# Patient Record
Sex: MALE | Race: WHITE | NOT HISPANIC OR LATINO | Employment: FULL TIME | URBAN - METROPOLITAN AREA
[De-identification: names, ages, dates, MRNs, and addresses within clinical notes are randomized per-mention and may not be internally consistent; named-entity substitution may affect disease eponyms.]

---

## 2020-08-05 ENCOUNTER — OFFICE VISIT (OUTPATIENT)
Dept: UROLOGY | Facility: AMBULATORY SURGERY CENTER | Age: 40
End: 2020-08-05
Payer: COMMERCIAL

## 2020-08-05 VITALS
WEIGHT: 143.4 LBS | HEART RATE: 89 BPM | BODY MASS INDEX: 21.24 KG/M2 | SYSTOLIC BLOOD PRESSURE: 110 MMHG | DIASTOLIC BLOOD PRESSURE: 72 MMHG | HEIGHT: 69 IN | TEMPERATURE: 97.3 F

## 2020-08-05 DIAGNOSIS — Z30.2 ENCOUNTER FOR STERILIZATION: Primary | ICD-10-CM

## 2020-08-05 PROCEDURE — 99203 OFFICE O/P NEW LOW 30 MIN: CPT | Performed by: UROLOGY

## 2020-08-05 RX ORDER — ALBUTEROL SULFATE 90 UG/1
2 AEROSOL, METERED RESPIRATORY (INHALATION)
COMMUNITY
Start: 2015-01-02

## 2020-08-05 RX ORDER — LORAZEPAM 2 MG/1
2 TABLET ORAL
Qty: 1 TABLET | Refills: 0 | Status: SHIPPED | OUTPATIENT
Start: 2020-08-05

## 2020-08-05 NOTE — PROGRESS NOTES
8/5/2020    Marleny Low  1980  [id number]        Assessment  Elective sterilization    Plan  We discussed our vasectomy packet in detail  He understands the indications, risks, and benefits of the procedure  He understands that this is generally considered a permanent procedure although there is a method for reversal, it is not guaranteed to work and would not be covered by insurance  He understands that he would not be considered sterile until negative semen analysis is obtained post procedure  He further understands that he must rest, ice, and elevate the scrotum for at least 48 hours to avoid complications such as hematoma  Consent was obtained in the office today  All of his questions were answered  A prescription for Ativan to be taken prior to the visit was sent to his pharmacy  History of Present Illness  Linda Lopez is a 38 y o  male with 4 children, youngest is 6month-old  He and his wife would like to prevent further pregnancies with permanent sterilization  He denies any prior urologic condition or surgery  He works with thermal labels, occasionally lifting items  Review of Systems  Review of Systems   Constitutional: Negative  HENT: Negative  Respiratory: Negative  Cardiovascular: Negative  Gastrointestinal: Negative  Genitourinary:        As per HPI   Musculoskeletal: Negative  Skin: Negative  Neurological: Negative  Hematological: Negative  Past Medical History  History reviewed  No pertinent past medical history  Past Social History  History reviewed  No pertinent surgical history  Past Family History  History reviewed  No pertinent family history      Past Social history  Social History     Socioeconomic History    Marital status: Single     Spouse name: Not on file    Number of children: Not on file    Years of education: Not on file    Highest education level: Not on file   Occupational History    Not on file   Social Needs  Financial resource strain: Not on file    Food insecurity     Worry: Not on file     Inability: Not on file   [email or web address] needs     Medical: Not on file     Non-medical: Not on file   Tobacco Use    Smoking status: Never Smoker    Smokeless tobacco: Never Used   Substance and Sexual Activity    Alcohol use: Yes     Comment: socially    Drug use: Not on file    Sexual activity: Not on file   Lifestyle    Physical activity     Days per week: Not on file     Minutes per session: Not on file    Stress: Not on file   Relationships    Social connections     Talks on phone: Not on file     Gets together: Not on file     Attends [employer] service: Not on file     Active member of club or organization: Not on file     Attends meetings of clubs or organizations: Not on file     Relationship status: Not on file    Intimate partner violence     Fear of current or ex partner: Not on file     Emotionally abused: Not on file     Physically abused: Not on file     Forced sexual activity: Not on file   Other Topics Concern    Not on file   Social History Narrative    Not on file     Social History     Tobacco Use   Smoking Status Never Smoker   Smokeless Tobacco Never Used       Current Medications  Current Outpatient Medications   Medication Sig Dispense Refill    albuterol (ProAir HFA) 90 mcg/act inhaler Inhale 2 puffs       No current facility-administered medications for this visit  Allergies  No Known Allergies    Past Medical History, Social History, Family History, medications and allergies were reviewed  Vitals  Vitals:    08/05/20 1545   BP: 110/72   BP Location: Left arm   Patient Position: Sitting   Cuff Size: Adult   Pulse: 89   Temp: (!) 97 3 °F (36 3 °C)   TempSrc: Temporal   Weight: 65 kg (143 lb 6 4 oz)   Height: 5' 9" (1 753 m)       Physical Exam  Physical Exam   Constitutional: He is oriented to person, place, and time  He appears well-developed     HENT:   Head: Normocephalic and atraumatic  Eyes: Conjunctivae are normal    Cardiovascular: Normal rate  Pulmonary/Chest: Effort normal    Abdominal: Soft  Genitourinary:    Genitourinary Comments: Normal bilateral Vasa and testes  Musculoskeletal: Normal range of motion  Neurological: He is alert and oriented to person, place, and time  Skin: Skin is warm and dry  Vitals reviewed          Results  No results found for: PSA  No results found for: GLUCOSE, CALCIUM, NA, K, CO2, CL, BUN, CREATININE  No results found for: WBC, HGB, HCT, MCV, PLT

## 2020-09-30 ENCOUNTER — TELEPHONE (OUTPATIENT)
Dept: UROLOGY | Facility: AMBULATORY SURGERY CENTER | Age: 40
End: 2020-09-30

## 2020-10-09 ENCOUNTER — NURSE TRIAGE (OUTPATIENT)
Dept: OTHER | Facility: OTHER | Age: 40
End: 2020-10-09

## 2020-10-09 ENCOUNTER — PROCEDURE VISIT (OUTPATIENT)
Dept: UROLOGY | Facility: AMBULATORY SURGERY CENTER | Age: 40
End: 2020-10-09
Payer: COMMERCIAL

## 2020-10-09 VITALS
TEMPERATURE: 98.2 F | WEIGHT: 143 LBS | BODY MASS INDEX: 21.18 KG/M2 | SYSTOLIC BLOOD PRESSURE: 120 MMHG | HEIGHT: 69 IN | HEART RATE: 90 BPM | DIASTOLIC BLOOD PRESSURE: 60 MMHG

## 2020-10-09 DIAGNOSIS — R11.0 NAUSEA: Primary | ICD-10-CM

## 2020-10-09 DIAGNOSIS — Z30.2 ENCOUNTER FOR STERILIZATION: Primary | ICD-10-CM

## 2020-10-09 PROCEDURE — 88302 TISSUE EXAM BY PATHOLOGIST: CPT | Performed by: PATHOLOGY

## 2020-10-09 PROCEDURE — 55250 REMOVAL OF SPERM DUCT(S): CPT | Performed by: UROLOGY

## 2020-10-09 RX ORDER — HYDROCODONE BITARTRATE AND ACETAMINOPHEN 5; 325 MG/1; MG/1
1 TABLET ORAL EVERY 6 HOURS PRN
Qty: 5 TABLET | Refills: 0 | Status: SHIPPED | OUTPATIENT
Start: 2020-10-09

## 2020-10-09 RX ORDER — ONDANSETRON 4 MG/1
4 TABLET, ORALLY DISINTEGRATING ORAL EVERY 6 HOURS PRN
Qty: 3 TABLET | Refills: 0 | Status: SHIPPED | OUTPATIENT
Start: 2020-10-09

## 2020-10-15 ENCOUNTER — TELEPHONE (OUTPATIENT)
Dept: UROLOGY | Facility: MEDICAL CENTER | Age: 40
End: 2020-10-15

## 2020-10-23 ENCOUNTER — OFFICE VISIT (OUTPATIENT)
Dept: UROLOGY | Facility: AMBULATORY SURGERY CENTER | Age: 40
End: 2020-10-23

## 2020-10-23 VITALS
HEART RATE: 70 BPM | HEIGHT: 69 IN | DIASTOLIC BLOOD PRESSURE: 70 MMHG | BODY MASS INDEX: 21.3 KG/M2 | WEIGHT: 143.8 LBS | SYSTOLIC BLOOD PRESSURE: 102 MMHG | TEMPERATURE: 97.1 F

## 2020-10-23 DIAGNOSIS — Z98.52 STATUS POST VASECTOMY: Primary | ICD-10-CM

## 2020-10-23 PROCEDURE — 99024 POSTOP FOLLOW-UP VISIT: CPT | Performed by: NURSE PRACTITIONER

## 2020-11-16 ENCOUNTER — HOSPITAL ENCOUNTER (EMERGENCY)
Facility: HOSPITAL | Age: 40
Discharge: HOME | End: 2020-11-16
Attending: EMERGENCY MEDICINE
Payer: COMMERCIAL

## 2020-11-16 VITALS
SYSTOLIC BLOOD PRESSURE: 159 MMHG | HEIGHT: 69 IN | BODY MASS INDEX: 21.48 KG/M2 | WEIGHT: 145 LBS | RESPIRATION RATE: 20 BRPM | DIASTOLIC BLOOD PRESSURE: 104 MMHG | OXYGEN SATURATION: 97 % | HEART RATE: 91 BPM | TEMPERATURE: 98.4 F

## 2020-11-16 DIAGNOSIS — S00.83XA TRAUMATIC HEMATOMA OF CHEEK, INITIAL ENCOUNTER: Primary | ICD-10-CM

## 2020-11-16 PROCEDURE — 99283 EMERGENCY DEPT VISIT LOW MDM: CPT

## 2020-11-16 PROCEDURE — 99282 EMERGENCY DEPT VISIT SF MDM: CPT | Performed by: EMERGENCY MEDICINE

## 2020-11-16 RX ORDER — ACETAMINOPHEN 325 MG/1
975 TABLET ORAL ONCE
Status: COMPLETED | OUTPATIENT
Start: 2020-11-16 | End: 2020-11-16

## 2020-11-16 RX ORDER — IBUPROFEN 400 MG/1
400 TABLET ORAL ONCE
Status: COMPLETED | OUTPATIENT
Start: 2020-11-16 | End: 2020-11-16

## 2020-11-16 RX ADMIN — ACETAMINOPHEN 975 MG: 325 TABLET, FILM COATED ORAL at 18:21

## 2020-11-16 RX ADMIN — IBUPROFEN 400 MG: 400 TABLET ORAL at 18:21

## 2020-12-07 ENCOUNTER — OFFICE VISIT (OUTPATIENT)
Dept: LAB | Facility: HOSPITAL | Age: 40
End: 2020-12-07
Payer: COMMERCIAL

## 2020-12-07 DIAGNOSIS — Z98.52 STATUS POST VASECTOMY: ICD-10-CM

## 2020-12-07 LAB
DEPRECATED CD4 CELLS/CD8 CELLS BLD: 3 ML
SPERM MOTILE SMN QL MICRO: ABNORMAL

## 2020-12-07 PROCEDURE — 89321 SEMEN ANAL SPERM DETECTION: CPT

## 2020-12-21 ENCOUNTER — TELEPHONE (OUTPATIENT)
Dept: UROLOGY | Facility: CLINIC | Age: 40
End: 2020-12-21

## 2020-12-21 DIAGNOSIS — Z98.52 STATUS POST VASECTOMY: Primary | ICD-10-CM

## 2020-12-23 ENCOUNTER — OFFICE VISIT (OUTPATIENT)
Dept: LAB | Facility: HOSPITAL | Age: 40
End: 2020-12-23
Payer: COMMERCIAL

## 2020-12-23 DIAGNOSIS — Z98.52 STATUS POST VASECTOMY: ICD-10-CM

## 2020-12-23 LAB
DEPRECATED CD4 CELLS/CD8 CELLS BLD: 1 ML
SPERM MOTILE SMN QL MICRO: ABNORMAL

## 2020-12-23 PROCEDURE — 89321 SEMEN ANAL SPERM DETECTION: CPT

## 2021-04-19 ENCOUNTER — IMMUNIZATIONS (OUTPATIENT)
Dept: FAMILY MEDICINE CLINIC | Facility: HOSPITAL | Age: 41
End: 2021-04-19

## 2021-04-19 DIAGNOSIS — Z23 ENCOUNTER FOR IMMUNIZATION: Primary | ICD-10-CM

## 2021-04-19 PROCEDURE — 91300 SARS-COV-2 / COVID-19 MRNA VACCINE (PFIZER-BIONTECH) 30 MCG: CPT

## 2021-04-19 PROCEDURE — 0001A SARS-COV-2 / COVID-19 MRNA VACCINE (PFIZER-BIONTECH) 30 MCG: CPT

## 2021-05-14 ENCOUNTER — IMMUNIZATIONS (OUTPATIENT)
Dept: FAMILY MEDICINE CLINIC | Facility: HOSPITAL | Age: 41
End: 2021-05-14

## 2021-05-14 DIAGNOSIS — Z23 ENCOUNTER FOR IMMUNIZATION: Primary | ICD-10-CM

## 2021-05-14 PROCEDURE — 91300 SARS-COV-2 / COVID-19 MRNA VACCINE (PFIZER-BIONTECH) 30 MCG: CPT

## 2021-05-14 PROCEDURE — 0002A SARS-COV-2 / COVID-19 MRNA VACCINE (PFIZER-BIONTECH) 30 MCG: CPT

## 2023-11-19 ENCOUNTER — HOSPITAL ENCOUNTER (EMERGENCY)
Facility: HOSPITAL | Age: 43
Discharge: HOME | End: 2023-11-19
Attending: EMERGENCY MEDICINE
Payer: COMMERCIAL

## 2023-11-19 ENCOUNTER — APPOINTMENT (EMERGENCY)
Dept: RADIOLOGY | Facility: HOSPITAL | Age: 43
End: 2023-11-19
Payer: COMMERCIAL

## 2023-11-19 VITALS
DIASTOLIC BLOOD PRESSURE: 84 MMHG | BODY MASS INDEX: 21.41 KG/M2 | HEART RATE: 84 BPM | SYSTOLIC BLOOD PRESSURE: 128 MMHG | TEMPERATURE: 97.5 F | RESPIRATION RATE: 16 BRPM | OXYGEN SATURATION: 97 % | WEIGHT: 145 LBS

## 2023-11-19 DIAGNOSIS — K63.89 MASS OF COLON: ICD-10-CM

## 2023-11-19 DIAGNOSIS — R19.7 DIARRHEA: ICD-10-CM

## 2023-11-19 DIAGNOSIS — K62.5 RECTAL BLEEDING: ICD-10-CM

## 2023-11-19 DIAGNOSIS — K52.9 COLITIS: ICD-10-CM

## 2023-11-19 DIAGNOSIS — K57.92 DIVERTICULITIS: Primary | ICD-10-CM

## 2023-11-19 DIAGNOSIS — R10.30 LOWER ABDOMINAL PAIN: ICD-10-CM

## 2023-11-19 LAB
ALBUMIN SERPL BCP-MCNC: 4.4 G/DL (ref 3.5–5)
ALP SERPL-CCNC: 73 U/L (ref 34–104)
ALT SERPL W P-5'-P-CCNC: 18 U/L (ref 7–52)
ANION GAP SERPL CALCULATED.3IONS-SCNC: 11 MMOL/L
AST SERPL W P-5'-P-CCNC: 19 U/L (ref 13–39)
BASOPHILS # BLD AUTO: 0.04 THOUSANDS/ÂΜL (ref 0–0.1)
BASOPHILS NFR BLD AUTO: 0 % (ref 0–1)
BILIRUB SERPL-MCNC: 1.25 MG/DL (ref 0.2–1)
BUN SERPL-MCNC: 16 MG/DL (ref 5–25)
CALCIUM SERPL-MCNC: 9.5 MG/DL (ref 8.4–10.2)
CHLORIDE SERPL-SCNC: 100 MMOL/L (ref 96–108)
CO2 SERPL-SCNC: 25 MMOL/L (ref 21–32)
CREAT SERPL-MCNC: 1.05 MG/DL (ref 0.6–1.3)
EOSINOPHIL # BLD AUTO: 0.05 THOUSAND/ÂΜL (ref 0–0.61)
EOSINOPHIL NFR BLD AUTO: 0 % (ref 0–6)
ERYTHROCYTE [DISTWIDTH] IN BLOOD BY AUTOMATED COUNT: 13.6 % (ref 11.6–15.1)
GFR SERPL CREATININE-BSD FRML MDRD: 85 ML/MIN/1.73SQ M
GLUCOSE SERPL-MCNC: 91 MG/DL (ref 65–140)
HCT VFR BLD AUTO: 44.4 % (ref 36.5–49.3)
HGB BLD-MCNC: 14.9 G/DL (ref 12–17)
IMM GRANULOCYTES # BLD AUTO: 0.03 THOUSAND/UL (ref 0–0.2)
IMM GRANULOCYTES NFR BLD AUTO: 0 % (ref 0–2)
LYMPHOCYTES # BLD AUTO: 2.05 THOUSANDS/ÂΜL (ref 0.6–4.47)
LYMPHOCYTES NFR BLD AUTO: 18 % (ref 14–44)
MCH RBC QN AUTO: 31.5 PG (ref 26.8–34.3)
MCHC RBC AUTO-ENTMCNC: 33.6 G/DL (ref 31.4–37.4)
MCV RBC AUTO: 94 FL (ref 82–98)
MONOCYTES # BLD AUTO: 1.03 THOUSAND/ÂΜL (ref 0.17–1.22)
MONOCYTES NFR BLD AUTO: 9 % (ref 4–12)
NEUTROPHILS # BLD AUTO: 8.51 THOUSANDS/ÂΜL (ref 1.85–7.62)
NEUTS SEG NFR BLD AUTO: 73 % (ref 43–75)
NRBC BLD AUTO-RTO: 0 /100 WBCS
PLATELET # BLD AUTO: 265 THOUSANDS/UL (ref 149–390)
PMV BLD AUTO: 10.2 FL (ref 8.9–12.7)
POTASSIUM SERPL-SCNC: 3.7 MMOL/L (ref 3.5–5.3)
PROT SERPL-MCNC: 7.5 G/DL (ref 6.4–8.4)
RBC # BLD AUTO: 4.73 MILLION/UL (ref 3.88–5.62)
SODIUM SERPL-SCNC: 136 MMOL/L (ref 135–147)
WBC # BLD AUTO: 11.71 THOUSAND/UL (ref 4.31–10.16)

## 2023-11-19 PROCEDURE — 96366 THER/PROPH/DIAG IV INF ADDON: CPT

## 2023-11-19 PROCEDURE — 99284 EMERGENCY DEPT VISIT MOD MDM: CPT

## 2023-11-19 PROCEDURE — G1004 CDSM NDSC: HCPCS

## 2023-11-19 PROCEDURE — 36415 COLL VENOUS BLD VENIPUNCTURE: CPT

## 2023-11-19 PROCEDURE — 80053 COMPREHEN METABOLIC PANEL: CPT

## 2023-11-19 PROCEDURE — 74177 CT ABD & PELVIS W/CONTRAST: CPT

## 2023-11-19 PROCEDURE — 99285 EMERGENCY DEPT VISIT HI MDM: CPT | Performed by: EMERGENCY MEDICINE

## 2023-11-19 PROCEDURE — 96375 TX/PRO/DX INJ NEW DRUG ADDON: CPT

## 2023-11-19 PROCEDURE — 85025 COMPLETE CBC W/AUTO DIFF WBC: CPT

## 2023-11-19 PROCEDURE — 96365 THER/PROPH/DIAG IV INF INIT: CPT

## 2023-11-19 RX ORDER — AMOXICILLIN AND CLAVULANATE POTASSIUM 875; 125 MG/1; MG/1
1 TABLET, FILM COATED ORAL ONCE
Status: COMPLETED | OUTPATIENT
Start: 2023-11-19 | End: 2023-11-19

## 2023-11-19 RX ORDER — KETOROLAC TROMETHAMINE 30 MG/ML
15 INJECTION, SOLUTION INTRAMUSCULAR; INTRAVENOUS ONCE
Status: COMPLETED | OUTPATIENT
Start: 2023-11-19 | End: 2023-11-19

## 2023-11-19 RX ORDER — AMOXICILLIN AND CLAVULANATE POTASSIUM 875; 125 MG/1; MG/1
1 TABLET, FILM COATED ORAL EVERY 12 HOURS
Qty: 14 TABLET | Refills: 0 | Status: SHIPPED | OUTPATIENT
Start: 2023-11-19 | End: 2023-11-27

## 2023-11-19 RX ADMIN — AMOXICILLIN AND CLAVULANATE POTASSIUM 1 TABLET: 875; 125 TABLET, FILM COATED ORAL at 17:55

## 2023-11-19 RX ADMIN — SODIUM CHLORIDE, SODIUM LACTATE, POTASSIUM CHLORIDE, AND CALCIUM CHLORIDE 1000 ML: .6; .31; .03; .02 INJECTION, SOLUTION INTRAVENOUS at 13:35

## 2023-11-19 RX ADMIN — IOHEXOL 100 ML: 350 INJECTION, SOLUTION INTRAVENOUS at 16:06

## 2023-11-19 RX ADMIN — KETOROLAC TROMETHAMINE 15 MG: 30 INJECTION, SOLUTION INTRAMUSCULAR; INTRAVENOUS at 13:36

## 2023-11-19 NOTE — DISCHARGE INSTRUCTIONS
You were seen in the emergency department for diarrhea, blood in your stool, and lower abdominal pain. Your CT scan showed diverticulitis. Your CT scan also showed a mass or polyp in your colon. A referral was placed to gastroenterology for follow-up. A prescription for Augmentin and antibiotic was sent to your pharmacy. Instructions are attached regarding appropriate diet. If you have worsening pain, develop a fever, or unable to eat or drink, or have worsening rectal bleeding please return to the emergency department.

## 2023-11-19 NOTE — ED PROVIDER NOTES
History  Chief Complaint   Patient presents with    Abdominal Pain     Diarrhea since yesterday started with dark red stools last night, c/o back pain, LQ abd pain, chills     41-year-old male with no significant past medical history who presents complaining of diarrhea with bright red blood in his stool and lower abdominal pain that began 2 days ago. Patient states that he was out of town and had a cheeseburger at [facility] prior to his symptoms beginning. The patient additionally states that he has had low-grade fevers with a Tmax of 100.2 °F.  The patient states that yesterday the blood was bright red in color and today it was darker red. The patient denies rectal pain with bowel movements. The patient reports cramping lower abdominal pain that occasionally radiates to his lower back. The patient took Tylenol last night. The patient denies headache, URI symptoms, chest pain, shortness of breath, nausea, vomiting, dysuria, hematuria. Prior to Admission Medications   Prescriptions Last Dose Informant Patient Reported? Taking? HYDROcodone-acetaminophen (NORCO) 5-325 mg per tablet  Self No No   Sig: Take 1 tablet by mouth every 6 (six) hours as needed for pain for up to 5 dosesMax Daily Amount: 4 tablets   Patient not taking: Reported on 10/23/2020   LORazepam (ATIVAN) 2 mg tablet  Self No No   Sig: Take 1 tablet (2 mg total) by mouth 60 minutes pre-procedure   Patient not taking: Reported on 10/23/2020   albuterol (ProAir HFA) 90 mcg/act inhaler  Self Yes No   Sig: Inhale 2 puffs   ondansetron (ZOFRAN-ODT) 4 mg disintegrating tablet  Self No No   Sig: Take 1 tablet (4 mg total) by mouth every 6 (six) hours as needed for nausea or vomiting   Patient not taking: Reported on 10/23/2020      Facility-Administered Medications: None       History reviewed. No pertinent past medical history. Past Surgical History:   Procedure Laterality Date    VASECTOMY         History reviewed.  No pertinent family history. I have reviewed and agree with the history as documented. E-Cigarette/Vaping    E-Cigarette Use Never User      E-Cigarette/Vaping Substances     Social History     Tobacco Use    Smoking status: Never    Smokeless tobacco: Never   Vaping Use    Vaping Use: Never used   Substance Use Topics    Alcohol use: Yes     Comment: socially        Review of Systems   Constitutional:  Negative for chills and fever. HENT:  Negative for congestion and sore throat. Eyes:  Negative for pain and redness. Respiratory:  Negative for cough and shortness of breath. Cardiovascular:  Negative for chest pain and palpitations. Gastrointestinal:  Positive for abdominal pain, blood in stool and diarrhea. Negative for abdominal distention, constipation, nausea, rectal pain and vomiting. Genitourinary:  Negative for dysuria and hematuria. Musculoskeletal:  Negative for arthralgias and myalgias. Skin:  Negative for color change, pallor and rash. Neurological:  Negative for syncope, weakness, light-headedness, numbness and headaches. All other systems reviewed and are negative. Physical Exam  ED Triage Vitals   Temperature Pulse Respirations Blood Pressure SpO2   11/19/23 1231 11/19/23 1231 11/19/23 1231 11/19/23 1231 11/19/23 1231   97.5 °F (36.4 °C) 88 18 119/75 98 %      Temp Source Heart Rate Source Patient Position - Orthostatic VS BP Location FiO2 (%)   11/19/23 1231 11/19/23 1231 11/19/23 1315 11/19/23 1231 --   Temporal Monitor Sitting Right arm       Pain Score       11/19/23 1231       6             Orthostatic Vital Signs  Vitals:    11/19/23 1530 11/19/23 1630 11/19/23 1700 11/19/23 1800   BP: 111/76 112/63 117/81 128/84   Pulse: 78 80 84 84   Patient Position - Orthostatic VS: Sitting Sitting Sitting Sitting       Physical Exam  Constitutional:       General: He is not in acute distress. Appearance: Normal appearance. He is not ill-appearing, toxic-appearing or diaphoretic.    HENT: Head: Normocephalic and atraumatic. Nose: Nose normal.      Mouth/Throat:      Mouth: Mucous membranes are moist.      Pharynx: Oropharynx is clear. Eyes:      General: No scleral icterus. Conjunctiva/sclera: Conjunctivae normal.      Pupils: Pupils are equal, round, and reactive to light. Cardiovascular:      Rate and Rhythm: Normal rate and regular rhythm. Pulses: Normal pulses. Heart sounds: Normal heart sounds. No murmur heard. No friction rub. No gallop. Pulmonary:      Effort: Pulmonary effort is normal.      Breath sounds: Normal breath sounds. No wheezing, rhonchi or rales. Abdominal:      General: Abdomen is flat. Palpations: Abdomen is soft. Tenderness: There is abdominal tenderness in the right lower quadrant, suprapubic area and left lower quadrant. There is no right CVA tenderness, left CVA tenderness, guarding or rebound. Genitourinary:     Rectum: Normal.      Comments: No blood on rectal exam  Musculoskeletal:         General: No swelling or tenderness. Normal range of motion. Cervical back: Normal range of motion and neck supple. No rigidity or tenderness. Right lower leg: No edema. Left lower leg: No edema. Lymphadenopathy:      Cervical: No cervical adenopathy. Skin:     General: Skin is warm and dry. Capillary Refill: Capillary refill takes less than 2 seconds. Coloration: Skin is not jaundiced or pale. Findings: No bruising, erythema, lesion or rash. Neurological:      General: No focal deficit present. Mental Status: He is alert and oriented to person, place, and time.          ED Medications  Medications   lactated ringers bolus 1,000 mL (0 mL Intravenous Stopped 11/19/23 1750)   ketorolac (TORADOL) injection 15 mg (15 mg Intravenous Given 11/19/23 1336)   iohexol (OMNIPAQUE) 350 MG/ML injection (MULTI-DOSE) 100 mL (100 mL Intravenous Given 11/19/23 1606)   amoxicillin-clavulanate (AUGMENTIN) 875-125 mg per tablet 1 tablet (1 tablet Oral Given 11/19/23 6419)       Diagnostic Studies  Results Reviewed       Procedure Component Value Units Date/Time    Comprehensive metabolic panel [[id number]]  (Abnormal) Collected: 11/19/23 1337    Lab Status: Final result Specimen: Blood from Arm, Right Updated: 11/19/23 1428     Sodium 136 mmol/L      Potassium 3.7 mmol/L      Chloride 100 mmol/L      CO2 25 mmol/L      ANION GAP 11 mmol/L      BUN 16 mg/dL      Creatinine 1.05 mg/dL      Glucose 91 mg/dL      Calcium 9.5 mg/dL      AST 19 U/L      ALT 18 U/L      Alkaline Phosphatase 73 U/L      Total Protein 7.5 g/dL      Albumin 4.4 g/dL      Total Bilirubin 1.25 mg/dL      eGFR 85 ml/min/1.73sq m     Narrative:      National Kidney Disease Foundation guidelines for Chronic Kidney Disease (CKD):     Stage 1 with normal or high GFR (GFR > 90 mL/min/1.73 square meters)    Stage 2 Mild CKD (GFR = 60-89 mL/min/1.73 square meters)    Stage 3A Moderate CKD (GFR = 45-59 mL/min/1.73 square meters)    Stage 3B Moderate CKD (GFR = 30-44 mL/min/1.73 square meters)    Stage 4 Severe CKD (GFR = 15-29 mL/min/1.73 square meters)    Stage 5 End Stage CKD (GFR <15 mL/min/1.73 square meters)  Note: GFR calculation is accurate only with a steady state creatinine    CBC and differential [[id number]]  (Abnormal) Collected: 11/19/23 1337    Lab Status: Final result Specimen: Blood from Arm, Right Updated: 11/19/23 1403     WBC 11.71 Thousand/uL      RBC 4.73 Million/uL      Hemoglobin 14.9 g/dL      Hematocrit 44.4 %      MCV 94 fL      MCH 31.5 pg      MCHC 33.6 g/dL      RDW 13.6 %      MPV 10.2 fL      Platelets 740 Thousands/uL      nRBC 0 /100 WBCs      Neutrophils Relative 73 %      Immat GRANS % 0 %      Lymphocytes Relative 18 %      Monocytes Relative 9 %      Eosinophils Relative 0 %      Basophils Relative 0 %      Neutrophils Absolute 8.51 Thousands/µL      Immature Grans Absolute 0.03 Thousand/uL      Lymphocytes Absolute 2.05 Thousands/µL Monocytes Absolute 1.03 Thousand/µL      Eosinophils Absolute 0.05 Thousand/µL      Basophils Absolute 0.04 Thousands/µL                    CT abdomen pelvis with contrast   Final Result by Dhruv Agarwal MD (11/19 1738)      Sigmoid diverticulitis and colitis. No evidence of bowel perforation or abscess. Questionable polypoid lesion within the lumen of the proximal sigmoid colon. Recommend follow-up CT with oral contrast and/or colonoscopy, when medically improved, to    exclude underlying neoplasm. Workstation performed: [id number]               Procedures  Procedures      ED Course         CRAFFT      Flowsheet Row Most Recent Value   CRAFFT Initial Screen: During the past 12 months, did you:    1. Drink any alcohol (more than a few sips)? No Filed at: 11/19/2023 1310   2. Smoke any marijuana or hashish No Filed at: 11/19/2023 1310   3. Use anything else to get high? ("anything else" includes illegal drugs, over the counter and prescription drugs, and things that you sniff or 'wang')? No Filed at: 11/19/2023 1310                            SBIRT 22yo+      Flowsheet Row Most Recent Value   Initial Alcohol Screen: US AUDIT-C     1. How often do you have a drink containing alcohol? 0 Filed at: 11/19/2023 1310   2. How many drinks containing alcohol do you have on a typical day you are drinking? 0 Filed at: 11/19/2023 1310   3a. Male UNDER 65: How often do you have five or more drinks on one occasion? 0 Filed at: 11/19/2023 1310   3b. FEMALE Any Age, or MALE 65+: How often do you have 4 or more drinks on one occassion? 0 Filed at: 11/19/2023 1310   Audit-C Score 0 Filed at: 11/19/2023 1310   ALOK: How many times in the past year have you. .. Used an illegal drug or used a prescription medication for non-medical reasons?  Never Filed at: 11/19/2023 1310                  Medical Decision Making  21-year-old male with no significant past medical history who presents complaining of diarrhea with bright red blood in his stool and lower abdominal pain that began 2 days ago. Vitals are within the normal limits. On exam patient is alert and oriented, no acute distress, heart is regular rate and rhythm, lungs are clear to auscultation bilaterally, abdomen is soft with lower abdominal tenderness but no rebound or guarding, no CVA tenderness, normal rectal exam with no blood. Differential diagnosis includes viral enteritis, diverticulitis, appendicitis. Will order CBC, CMP, and CT of the abdomen and pelvis with IV contrast.  Will treat the patient's symptoms with Toradol and IV fluids. WBC count is mildly elevated at 11.17. The remainder the patient's lab work is reviewed and without actionable derangements. CT of the abdomen and pelvis shows sigmoid diverticulitis and colitis. CT additionally shows polypoid lesion in the lumen of the sigmoid colon with recommended follow-up CT scan with oral contrast or colonoscopy. The patient's pain is well controlled and he is tolerating oral intake and the patient is stable for discharge. Will order a dose of Augmentin. A prescription for Augmentin is sent to the patient's pharmacy. A referral to gastroenterology is ordered. Return precautions are given and the patient is discharged. Amount and/or Complexity of Data Reviewed  Labs: ordered. Radiology: ordered. Risk  Prescription drug management.           Disposition  Final diagnoses:   Diverticulitis   Colitis   Diarrhea   Lower abdominal pain   Rectal bleeding   Mass of colon     Time reflects when diagnosis was documented in both MDM as applicable and the Disposition within this note       Time User Action Codes Description Comment    11/19/2023  5:55 PM Willodean Plater Add [K57.92] Diverticulitis     11/19/2023  5:55 PM Maegan Mandy A Add [K52.9] Colitis     11/19/2023  5:55 PM Maegan Mandy A Add [R19.7] Diarrhea     11/19/2023  5:55 PM Maegan Mandy A Add [R10.30] Lower abdominal pain 11/19/2023  5:56 PM Maxim MCGOWAN Add [K62.5] Rectal bleeding     11/19/2023  5:56 PM Colie Crumb Add [Q22.05] Mass of colon           ED Disposition       ED Disposition   Discharge    Condition   Stable    Date/Time   Sun Nov 19, 2023 0533 [facility] discharge to home/self care. Follow-up Information       Follow up With Specialties Details Why [facility] Emergency Department Emergency Medicine Go to  If symptoms worsen [address] [place] [facility] Emergency Department, [address], [address], [place], [facility]            Discharge Medication List as of 11/19/2023  5:59 PM        START taking these medications    Details   amoxicillin-clavulanate (AUGMENTIN) 875-125 mg per tablet Take 1 tablet by mouth every 12 (twelve) hours for 7 days, Starting Sun 11/19/2023, Until Sun 11/26/2023, Normal           CONTINUE these medications which have NOT CHANGED    Details   albuterol (ProAir HFA) 90 mcg/act inhaler Inhale 2 puffs, Starting Fri 1/2/2015, Historical Med      HYDROcodone-acetaminophen (NORCO) 5-325 mg per tablet Take 1 tablet by mouth every 6 (six) hours as needed for pain for up to 5 dosesMax Daily Amount: 4 tablets, Starting Fri 10/9/2020, Normal      LORazepam (ATIVAN) 2 mg tablet Take 1 tablet (2 mg total) by mouth 60 minutes pre-procedure, Starting Wed 8/5/2020, Normal      ondansetron (ZOFRAN-ODT) 4 mg disintegrating tablet Take 1 tablet (4 mg total) by mouth every 6 (six) hours as needed for nausea or vomiting, Starting Fri 10/9/2020, Phone In               [address] Review       None             ED Provider  Attending physically available and evaluated Mary Jnae Case. I managed the patient along with the ED Attending.     Electronically Signed by           Rafael Roldan DO  11/19/23 2037

## 2023-11-19 NOTE — ED ATTENDING ATTESTATION
11/19/2023  IBob DO, saw and evaluated the patient. I have discussed the patient with the resident/non-physician practitioner and agree with the resident's/non-physician practitioner's findings, Plan of Care, and MDM as documented in the resident's/non-physician practitioner's note, except where noted. All available labs and Radiology studies were reviewed. I was present for key portions of any procedure(s) performed by the resident/non-physician practitioner and I was immediately available to provide assistance. At this point I agree with the current assessment done in the Emergency Department. I have conducted an independent evaluation of this patient a history and physical is as follows:    Patient is a 65-year-old male without significant medical history, accompanied by his wife.  2 days ago about 5 PM he was out at [facility], ate some food at [facility], then had to go to the bathroom right away. He said normally this happens sometimes it is not unusual for him but yesterday began having some lower abdominal crampy discomfort and multiple episodes of loose stools, somewhat watery and yesterday evening and this morning noticed some blood clots and bright red blood in the stool. He had a discomfort is coming and going in waves, waxing and waning, mild in severity does not need medication. He was on a dose of antibiotics about 1 month ago for strep throat but no additional antibiotics recently. No hospitalizations. No recent camping, backpacking, drinking from streams or well water exposure. No sick contacts. He believes everyone at [facility] ate different food but no one that ate at the restaurant to his knowledge has similar symptoms and he has no sick contacts. His wife and children have had a bit of a viral URI the last several days with some mild congestion and runny nose, but the patient himself has been asymptomatic.   He has never had a colonoscopy, nothing inserted inside the rectum, no intercourse, no toys, no scopes. Patient said his previous primary care physician has retired, he has a new PCP but has not seen him yet    General:  Patient is well-appearing  Head:  Atraumatic  Eyes:  Conjunctiva pink  ENT:  Mucous membranes are moist  Neck:  Supple  Cardiac:  S1-S2, without murmurs  Lungs:  Clear to auscultation bilaterally  Abdomen: Patient has some mild bilateral lower abdominal tenderness, no tympany, rigidity, no guarding, no CVA tenderness. Rectal examination shows no evidence of lesions around the rectum, digital rectal examination shows no blood, nontender, no palpable masses.   Rectal examination was supervised by male GABRIELLA Sharma  Extremities:  Normal range of motion  Neurologic:  Awake, fluent speech, normal comprehension, AAOx3  Skin:  Pink warm and dry  Psychiatric:  Alert, pleasant, cooperative      ED Course     Labs Reviewed   CBC AND DIFFERENTIAL - Abnormal       Result Value Ref Range Status    WBC 11.71 (*) 4.31 - 10.16 Thousand/uL Final    RBC 4.73  3.88 - 5.62 Million/uL Final    Hemoglobin 14.9  12.0 - 17.0 g/dL Final    Hematocrit 44.4  36.5 - 49.3 % Final    MCV 94  82 - 98 fL Final    MCH 31.5  26.8 - 34.3 pg Final    MCHC 33.6  31.4 - 37.4 g/dL Final    RDW 13.6  11.6 - 15.1 % Final    MPV 10.2  8.9 - 12.7 fL Final    Platelets 931  350 - 390 Thousands/uL Final    nRBC 0  /100 WBCs Final    Neutrophils Relative 73  43 - 75 % Final    Immat GRANS % 0  0 - 2 % Final    Lymphocytes Relative 18  14 - 44 % Final    Monocytes Relative 9  4 - 12 % Final    Eosinophils Relative 0  0 - 6 % Final    Basophils Relative 0  0 - 1 % Final    Neutrophils Absolute 8.51 (*) 1.85 - 7.62 Thousands/µL Final    Immature Grans Absolute 0.03  0.00 - 0.20 Thousand/uL Final    Lymphocytes Absolute 2.05  0.60 - 4.47 Thousands/µL Final    Monocytes Absolute 1.03  0.17 - 1.22 Thousand/µL Final    Eosinophils Absolute 0.05  0.00 - 0.61 Thousand/µL Final Basophils Absolute 0.04  0.00 - 0.10 Thousands/µL Final   COMPREHENSIVE METABOLIC PANEL - Abnormal    Sodium 136  135 - 147 mmol/L Final    Potassium 3.7  3.5 - 5.3 mmol/L Final    Chloride 100  96 - 108 mmol/L Final    CO2 25  21 - 32 mmol/L Final    ANION GAP 11  mmol/L Final    BUN 16  5 - 25 mg/dL Final    Creatinine 1.05  0.60 - 1.30 mg/dL Final    Comment: Standardized to IDMS reference method    Glucose 91  65 - 140 mg/dL Final    Comment: If the patient is fasting, the ADA then defines impaired fasting glucose as > 100 mg/dL and diabetes as > or equal to 123 mg/dL. Calcium 9.5  8.4 - 10.2 mg/dL Final    AST 19  13 - 39 U/L Final    ALT 18  7 - 52 U/L Final    Comment: Specimen collection should occur prior to Sulfasalazine administration due to the potential for falsely depressed results. Alkaline Phosphatase 73  34 - 104 U/L Final    Total Protein 7.5  6.4 - 8.4 g/dL Final    Albumin 4.4  3.5 - 5.0 g/dL Final    Total Bilirubin 1.25 (*) 0.20 - 1.00 mg/dL Final    Comment: Use of this assay is not recommended for patients undergoing treatment with eltrombopag due to the potential for falsely elevated results. N-acetyl-p-benzoquinone imine (metabolite of Acetaminophen) will generate erroneously low results in samples for patients that have taken an overdose of Acetaminophen.     eGFR 85  ml/min/1.73sq m Final    Narrative:     [place] guidelines for Chronic Kidney Disease (CKD):     Stage 1 with normal or high GFR (GFR > 90 mL/min/1.73 square meters)    Stage 2 Mild CKD (GFR = 60-89 mL/min/1.73 square meters)    Stage 3A Moderate CKD (GFR = 45-59 mL/min/1.73 square meters)    Stage 3B Moderate CKD (GFR = 30-44 mL/min/1.73 square meters)    Stage 4 Severe CKD (GFR = 15-29 mL/min/1.73 square meters)    Stage 5 End Stage CKD (GFR <15 mL/min/1.73 square meters)  Note: GFR calculation is accurate only with a steady state creatinine       CT abdomen pelvis with contrast   Final Result      Sigmoid diverticulitis and colitis. No evidence of bowel perforation or abscess. Questionable polypoid lesion within the lumen of the proximal sigmoid colon. Recommend follow-up CT with oral contrast and/or colonoscopy, when medically improved, to    exclude underlying neoplasm. Workstation performed: [id number]             On reassessment there was no change in the above findings. This point the patient has mild on complicated diverticulitis. He has no signs of peritonitis on exam, no signs of abscess or need for emergent surgical intervention. Patient has no signs of acute life-threatening intestinal perforation, abscess, peritonitis, or other acute pathology. I considered observation however the patient says he says pain is well controlled, feels comfortable and would prefer to be discharged home which I believe is reasonable. The CT results were discussed with the patient including the polyp and recommendations for follow-up with GI for colonoscopy and or CT reassessment, patient and wife indicated they understood. Supportive care, importance of follow-up and return precautions were discussed with patient and wife, who expressed understanding. The patient was evaluated for sepsis in the emergency department. After that assessment, at the time of admission, no sepsis, severe sepsis, or septic shock was found. DIAGNOSIS:  Acute uncomplicated diverticulitis    MEDICAL DECISION MAKING CODING    Patient presents with acute new problem with:  Threat to life or bodily function        COLLECTION AND INTERPRETATION OF DATA  Additional history obtained from: Wife    I ordered each unique test  Tests reviewed personally by me:  Labs: See above  Imaging: I independently reviewed the CT abdomen and pelvis and found acute uncomplicated diverticulitis.     RISK  Drugs (OTC, Rx, Controlled substances): Prescription management  All of the patient's current prescription medications should be continued.     Treatment:  Consideration of admission: see above      Surgery  -I considered surgery may be necessary prior to completion of the work up but afterwards there is no indication for immediate surgery    Social Determinants of Health:  Presentation to ED outside of business hours or on night shift      Critical Care Time  Procedures

## 2023-11-27 ENCOUNTER — OFFICE VISIT (OUTPATIENT)
Dept: GASTROENTEROLOGY | Facility: AMBULARY SURGERY CENTER | Age: 43
End: 2023-11-27
Payer: COMMERCIAL

## 2023-11-27 VITALS
HEART RATE: 70 BPM | DIASTOLIC BLOOD PRESSURE: 58 MMHG | WEIGHT: 143 LBS | SYSTOLIC BLOOD PRESSURE: 102 MMHG | HEIGHT: 70 IN | BODY MASS INDEX: 20.47 KG/M2 | OXYGEN SATURATION: 98 %

## 2023-11-27 DIAGNOSIS — K63.5 POLYP OF SIGMOID COLON, UNSPECIFIED TYPE: ICD-10-CM

## 2023-11-27 DIAGNOSIS — K57.92 ACUTE DIVERTICULITIS: ICD-10-CM

## 2023-11-27 DIAGNOSIS — R17 ELEVATED BILIRUBIN: Primary | ICD-10-CM

## 2023-11-27 DIAGNOSIS — K21.9 GASTROESOPHAGEAL REFLUX DISEASE, UNSPECIFIED WHETHER ESOPHAGITIS PRESENT: ICD-10-CM

## 2023-11-27 PROCEDURE — 99244 OFF/OP CNSLTJ NEW/EST MOD 40: CPT | Performed by: PHYSICIAN ASSISTANT

## 2023-11-27 NOTE — PATIENT INSTRUCTIONS
Scheduled date of EGD/colonoscopy (as of today):01/16/24  Physician performing EGD/colonoscopy: Dr. Lisandro Rebollar  Location of EGD/colonoscopy: [facility]  Desired bowel prep reviewed with patient: clenpiq  Instructions reviewed with patient by: arias cooper  Clearances:  n/a

## 2023-11-27 NOTE — LETTER
November 27, 2023     [place], [facility] [address]  [address] [id number]    Patient: Brown Vázquez   YOB: 1980   Date of Visit: 11/27/2023       Dear Dr. Roxy Solo: Thank you for referring Brown Vázquez to me for evaluation. Below are my notes for this consultation. If you have questions, please do not hesitate to call me. I look forward to following your patient along with you. Sincerely,        Anthony Torrez PA-C        CC: DO Anthony Monroe PA-C  11/27/2023 10:37 AM  Sign when Signing Visit  [facility] - Outpatient Consultation  Brown Vázquez 39 y.o. male MRN: [id number]  Encounter: [id number]          ASSESSMENT AND PLAN:      #1. Acute diverticulitis with colitis: noted on CT scan 11/19/23 in ED, was sent home with 7 days of Augmentin. WBC slightly elevated in ED. Never had a colonoscopy, no family history of colon cancer, has hx of polyps in brother and diverticulitis in mother and grandmother  -recommend colonoscopy in 6-8 weeks from diagnosis after resolution of inflammation to assess the area  -discussed procedure, risks including perforation, infection, and bleeding, and benefits in detail with patient   -clenpiq prep ordered, if not covered can do miralax/dulcolax  -no blood thinners or diabetes     #2. Questionable polypoid sigmoid colon lesion: noted on CT scan  -colonoscopy as above to assess     #3. Elevated bilirubin: likely gilbert's or infection related, all other LFTs normal, liver normal on CT scan  -repeat hepatic function panel in a few months     #4. GERD: reports reflux off and on for last several years, has used omeprazole over the counter as needed and Pepcid as needed.  Tries to manage with diet  -discussed anti-reflux diet  -can use pepcid daily, call with any worsening symptoms and can start PPI  -would recommend EGD at same time as colonoscopy to rule out harmon's due to chronic symptoms for last several years   ______________________________________________________________________    HPI: This is a 69-year-old male with no significant past medical history who presents for new patient visit after recent ER visit for diverticulitis. Patient was seen in the emergency room on 11/19 secondary to diarrhea with bright red blood in his stool and lower abdominal pain that started approximately 2 days prior. He reports that he was out of town and he had a cheeseburger at [facility] prior to his symptoms beginning. He had a low-grade fever at home. His CT scan in ED was notable for acute diverticulitis with colitis as well as a questional polypoid sigmoid colon lesion. He was recommended for GI evaluation and colonoscopy after resolution of symptoms. He was ordered for 7 days of Augmentin which he is completing. He reports his symptoms are improved. He had a small amount of pain on Friday which resolved. His bowels are regular without blood at this point. He has been following a low fiber diet. He does report intermittent loose and soft stools chronically pending what he eats. He denies ever having bleeding or abdominal pain like this in the past. This is his first diagnosis of diverticulitis. No family history of colon cancer but grandmother and mother have hx diverticulitis and brother recently had polyps removed. He also reports some chronic issues with reflux for the last several years. He takes omeprazole intermittently over the counter as well as pepcid and tums. Symptoms worse at night. Denies dysphagia, nausea, vomiting. Never had an EGD. No unexplained weight loss. Smokes cigars every few months. Social occasional alcohol use, no significant NSAID use. REVIEW OF SYSTEMS:    CONSTITUTIONAL: Denies any fever, chills, rigors, and weight loss. HEENT: No earache or tinnitus. Denies hearing loss or visual disturbances.   CARDIOVASCULAR: No chest pain or palpitations. RESPIRATORY: Denies any cough, hemoptysis, shortness of breath or dyspnea on exertion. GASTROINTESTINAL: As noted in the History of Present Illness. GENITOURINARY: No problems with urination. Denies any hematuria or dysuria. NEUROLOGIC: No dizziness or vertigo, denies headaches. MUSCULOSKELETAL: Denies any muscle or joint pain. SKIN: Denies skin rashes or itching. ENDOCRINE: Denies excessive thirst. Denies intolerance to heat or cold. PSYCHOSOCIAL: Denies depression or anxiety. Denies any recent memory loss. Historical Information  No past medical history on file. Past Surgical History:   Procedure Laterality Date   • VASECTOMY       Social History  Social History     Substance and Sexual Activity   Alcohol Use Yes    Comment: socially     Social History     Substance and Sexual Activity   Drug Use Not on file     Social History     Tobacco Use   Smoking Status Never   Smokeless Tobacco Never     No family history on file. Meds/Allergies      Current Outpatient Medications:   •  albuterol (ProAir HFA) 90 mcg/act inhaler  •  HYDROcodone-acetaminophen (NORCO) 5-325 mg per tablet  •  LORazepam (ATIVAN) 2 mg tablet  •  ondansetron (ZOFRAN-ODT) 4 mg disintegrating tablet    No Known Allergies        Objective    /58   BP Location Left arm   Patient Position Sitting   Cuff Size Standard   Pulse 70   SpO2 98 %   Weight 64.9 kg (143 lb)   Height 5' 10" (1.778 m)   Pain Score 0-No pain           PHYSICAL EXAM:      General Appearance:   Alert, cooperative, no distress   HEENT:   Normocephalic, atraumatic, anicteric. Neck:  Supple, symmetrical, trachea midline   Lungs:   Clear to auscultation bilaterally; no rales, rhonchi or wheezing; respirations unlabored    Heart[de-identified]   Regular rate and rhythm; no murmur, rub, or gallop.    Abdomen:   Soft, non-tender, non-distended; normal bowel sounds; no masses, no organomegaly    Genitalia:   Deferred    Rectal:   Deferred    Extremities:  No cyanosis, clubbing or edema    Pulses:  2+ and symmetric    Skin:  No jaundice, rashes, or lesions    Lymph nodes:  No palpable cervical lymphadenopathy        Lab Results:   No visits with results within 1 Day(s) from this visit. Latest known visit with results is:   Admission on 11/19/2023, Discharged on 11/19/2023   Component Date Value   • WBC 11/19/2023 11.71 (H)    • RBC 11/19/2023 4.73    • Hemoglobin 11/19/2023 14.9    • Hematocrit 11/19/2023 44.4    • MCV 11/19/2023 94    • MCH 11/19/2023 31.5    • MCHC 11/19/2023 33.6    • RDW 11/19/2023 13.6    • MPV 11/19/2023 10.2    • Platelets 98/50/0758 265    • nRBC 11/19/2023 0    • Neutrophils Relative 11/19/2023 73    • Immat GRANS % 11/19/2023 0    • Lymphocytes Relative 11/19/2023 18    • Monocytes Relative 11/19/2023 9    • Eosinophils Relative 11/19/2023 0    • Basophils Relative 11/19/2023 0    • Neutrophils Absolute 11/19/2023 8.51 (H)    • Immature Grans Absolute 11/19/2023 0.03    • Lymphocytes Absolute 11/19/2023 2.05    • Monocytes Absolute 11/19/2023 1.03    • Eosinophils Absolute 11/19/2023 0.05    • Basophils Absolute 11/19/2023 0.04    • Sodium 11/19/2023 136    • Potassium 11/19/2023 3.7    • Chloride 11/19/2023 100    • CO2 11/19/2023 25    • ANION GAP 11/19/2023 11    • BUN 11/19/2023 16    • Creatinine 11/19/2023 1.05    • Glucose 11/19/2023 91    • Calcium 11/19/2023 9.5    • AST 11/19/2023 19    • ALT 11/19/2023 18    • Alkaline Phosphatase 11/19/2023 73    • Total Protein 11/19/2023 7.5    • Albumin 11/19/2023 4.4    • Total Bilirubin 11/19/2023 1.25 (H)    • eGFR 11/19/2023 85          Radiology Results:   CT abdomen pelvis with contrast    Result Date: 11/19/2023  Narrative: CT ABDOMEN AND PELVIS WITH IV CONTRAST INDICATION:   LLQ abdominal pain Lower abdominal pain. COMPARISON:  None. TECHNIQUE:  CT examination of the abdomen and pelvis was performed. Multiplanar 2D reformatted images were created from the source data.  This examination, like all CT scans performed in the [facility], was performed utilizing techniques to minimize radiation dose exposure, including the use of iterative reconstruction and automated exposure control. Radiation dose length product (DLP) for this visit:  313 mGy-cm IV Contrast:  100 mL of iohexol (OMNIPAQUE) Enteric Contrast:  Enteric contrast was not administered. FINDINGS: ABDOMEN LOWER CHEST: Clear lung bases. LIVER/BILIARY TREE:  Unremarkable. GALLBLADDER:  No calcified gallstones. No pericholecystic inflammatory change. SPLEEN:  Unremarkable. PANCREAS:  Unremarkable. ADRENAL GLANDS:  Unremarkable. KIDNEYS/URETERS: Symmetric nephrographic phase enhancement of the kidneys. No obstructive uropathy STOMACH AND BOWEL: Small hiatal hernia. Inflamed diverticuli in the sigmoid colon and adjacent mesenteric fat stranding. Underlying colonic wall thickening. Questionable polypoid lesion within the lumen of the proximal sigmoid colon. No evidence of bowel  obstruction. APPENDIX:  No findings to suggest appendicitis. ABDOMINOPELVIC CAVITY:  No free intraperitoneal air or fluid collection. VESSELS: No abdominal aortic aneurysm. PELVIS REPRODUCTIVE ORGANS: No prostate enlargement. URINARY BLADDER:  Unremarkable. ABDOMINAL WALL/INGUINAL REGIONS:  Unremarkable. OSSEOUS STRUCTURES:  No acute fracture or destructive osseous lesion. Impression: Sigmoid diverticulitis and colitis. No evidence of bowel perforation or abscess. Questionable polypoid lesion within the lumen of the proximal sigmoid colon. Recommend follow-up CT with oral contrast and/or colonoscopy, when medically improved, to exclude underlying neoplasm.  Workstation performed: [id number]   Answers submitted by the patient for this visit:  Abdominal Pain Questionnaire (Submitted on 11/27/2023)  Chief Complaint: Abdominal pain  Chronicity: new  Onset: 1 to 4 weeks ago  Onset quality: sudden  Frequency: rarely  Episode duration: 2 Days  Progression since onset: gradually worsening  Pain location: LLQ  Pain - numeric: 6/10  Pain quality: dull, sharp  Radiates to: pelvis, perineum  anorexia: No  arthralgias: No  belching: No  constipation: No  diarrhea: Yes  dysuria: No  fever: Yes  flatus: No  frequency: No  headaches: No  hematochezia: Yes  hematuria: No  melena: No  myalgias: Yes  nausea:  No  weight loss: No  vomiting: No  Aggravated by: certain positions  Relieved by: certain positions, standing  Diagnostic workup: CT scan

## 2023-11-27 NOTE — PROGRESS NOTES
[facility] - Outpatient Consultation  Valentino Ngo 39 y.o. male MRN: [id number]  Encounter: [id number]          ASSESSMENT AND PLAN:      #1. Acute diverticulitis with colitis: noted on CT scan 11/19/23 in ED, was sent home with 7 days of Augmentin. WBC slightly elevated in ED. Never had a colonoscopy, no family history of colon cancer, has hx of polyps in brother and diverticulitis in mother and grandmother  -recommend colonoscopy in 6-8 weeks from diagnosis after resolution of inflammation to assess the area  -discussed procedure, risks including perforation, infection, and bleeding, and benefits in detail with patient   -clenpiq prep ordered, if not covered can do miralax/dulcolax  -no blood thinners or diabetes     #2. Questionable polypoid sigmoid colon lesion: noted on CT scan  -colonoscopy as above to assess     #3. Elevated bilirubin: likely gilbert's or infection related, all other LFTs normal, liver normal on CT scan  -repeat hepatic function panel in a few months     #4. GERD: reports reflux off and on for last several years, has used omeprazole over the counter as needed and Pepcid as needed. Tries to manage with diet  -discussed anti-reflux diet  -can use pepcid daily, call with any worsening symptoms and can start PPI  -would recommend EGD at same time as colonoscopy to rule out harmon's due to chronic symptoms for last several years   ______________________________________________________________________    HPI: This is a 41-year-old male with no significant past medical history who presents for new patient visit after recent ER visit for diverticulitis. Patient was seen in the emergency room on 11/19 secondary to diarrhea with bright red blood in his stool and lower abdominal pain that started approximately 2 days prior. He reports that he was out of town and he had a cheeseburger at [facility] prior to his symptoms beginning. He had a low-grade fever at home. His CT scan in ED was notable for acute diverticulitis with colitis as well as a questional polypoid sigmoid colon lesion. He was recommended for GI evaluation and colonoscopy after resolution of symptoms. He was ordered for 7 days of Augmentin which he is completing. He reports his symptoms are improved. He had a small amount of pain on Friday which resolved. His bowels are regular without blood at this point. He has been following a low fiber diet. He does report intermittent loose and soft stools chronically pending what he eats. He denies ever having bleeding or abdominal pain like this in the past. This is his first diagnosis of diverticulitis. No family history of colon cancer but grandmother and mother have hx diverticulitis and brother recently had polyps removed. He also reports some chronic issues with reflux for the last several years. He takes omeprazole intermittently over the counter as well as pepcid and tums. Symptoms worse at night. Denies dysphagia, nausea, vomiting. Never had an EGD. No unexplained weight loss. Smokes cigars every few months. Social occasional alcohol use, no significant NSAID use. REVIEW OF SYSTEMS:    CONSTITUTIONAL: Denies any fever, chills, rigors, and weight loss. HEENT: No earache or tinnitus. Denies hearing loss or visual disturbances. CARDIOVASCULAR: No chest pain or palpitations. RESPIRATORY: Denies any cough, hemoptysis, shortness of breath or dyspnea on exertion. GASTROINTESTINAL: As noted in the History of Present Illness. GENITOURINARY: No problems with urination. Denies any hematuria or dysuria. NEUROLOGIC: No dizziness or vertigo, denies headaches. MUSCULOSKELETAL: Denies any muscle or joint pain. SKIN: Denies skin rashes or itching. ENDOCRINE: Denies excessive thirst. Denies intolerance to heat or cold. PSYCHOSOCIAL: Denies depression or anxiety. Denies any recent memory loss.        Historical Information   No past medical history on file.  Past Surgical History:   Procedure Laterality Date    VASECTOMY       Social History   Social History     Substance and Sexual Activity   Alcohol Use Yes    Comment: socially     Social History     Substance and Sexual Activity   Drug Use Not on file     Social History     Tobacco Use   Smoking Status Never   Smokeless Tobacco Never     No family history on file. Meds/Allergies       Current Outpatient Medications:     albuterol (ProAir HFA) 90 mcg/act inhaler    HYDROcodone-acetaminophen (NORCO) 5-325 mg per tablet    LORazepam (ATIVAN) 2 mg tablet    ondansetron (ZOFRAN-ODT) 4 mg disintegrating tablet    No Known Allergies        Objective     /58   BP Location Left arm   Patient Position Sitting   Cuff Size Standard   Pulse 70   SpO2 98 %   Weight 64.9 kg (143 lb)   Height 5' 10" (1.778 m)   Pain Score 0-No pain           PHYSICAL EXAM:      General Appearance:   Alert, cooperative, no distress   HEENT:   Normocephalic, atraumatic, anicteric. Neck:  Supple, symmetrical, trachea midline   Lungs:   Clear to auscultation bilaterally; no rales, rhonchi or wheezing; respirations unlabored    Heart[de-identified]   Regular rate and rhythm; no murmur, rub, or gallop. Abdomen:   Soft, non-tender, non-distended; normal bowel sounds; no masses, no organomegaly    Genitalia:   Deferred    Rectal:   Deferred    Extremities:  No cyanosis, clubbing or edema    Pulses:  2+ and symmetric    Skin:  No jaundice, rashes, or lesions    Lymph nodes:  No palpable cervical lymphadenopathy        Lab Results:   No visits with results within 1 Day(s) from this visit.    Latest known visit with results is:   Admission on 11/19/2023, Discharged on 11/19/2023   Component Date Value    WBC 11/19/2023 11.71 (H)     RBC 11/19/2023 4.73     Hemoglobin 11/19/2023 14.9     Hematocrit 11/19/2023 44.4     MCV 11/19/2023 94     MCH 11/19/2023 31.5     MCHC 11/19/2023 33.6     RDW 11/19/2023 13.6     MPV 11/19/2023 10.2     Platelets 11/19/2023 265     nRBC 11/19/2023 0     Neutrophils Relative 11/19/2023 73     Immat GRANS % 11/19/2023 0     Lymphocytes Relative 11/19/2023 18     Monocytes Relative 11/19/2023 9     Eosinophils Relative 11/19/2023 0     Basophils Relative 11/19/2023 0     Neutrophils Absolute 11/19/2023 8.51 (H)     Immature Grans Absolute 11/19/2023 0.03     Lymphocytes Absolute 11/19/2023 2.05     Monocytes Absolute 11/19/2023 1.03     Eosinophils Absolute 11/19/2023 0.05     Basophils Absolute 11/19/2023 0.04     Sodium 11/19/2023 136     Potassium 11/19/2023 3.7     Chloride 11/19/2023 100     CO2 11/19/2023 25     ANION GAP 11/19/2023 11     BUN 11/19/2023 16     Creatinine 11/19/2023 1.05     Glucose 11/19/2023 91     Calcium 11/19/2023 9.5     AST 11/19/2023 19     ALT 11/19/2023 18     Alkaline Phosphatase 11/19/2023 73     Total Protein 11/19/2023 7.5     Albumin 11/19/2023 4.4     Total Bilirubin 11/19/2023 1.25 (H)     eGFR 11/19/2023 85          Radiology Results:   CT abdomen pelvis with contrast    Result Date: 11/19/2023  Narrative: CT ABDOMEN AND PELVIS WITH IV CONTRAST INDICATION:   LLQ abdominal pain Lower abdominal pain. COMPARISON:  None. TECHNIQUE:  CT examination of the abdomen and pelvis was performed. Multiplanar 2D reformatted images were created from the source data. This examination, like all CT scans performed in the [facility], was performed utilizing techniques to minimize radiation dose exposure, including the use of iterative reconstruction and automated exposure control. Radiation dose length product (DLP) for this visit:  313 mGy-cm IV Contrast:  100 mL of iohexol (OMNIPAQUE) Enteric Contrast:  Enteric contrast was not administered. FINDINGS: ABDOMEN LOWER CHEST: Clear lung bases. LIVER/BILIARY TREE:  Unremarkable. GALLBLADDER:  No calcified gallstones. No pericholecystic inflammatory change. SPLEEN:  Unremarkable. PANCREAS:  Unremarkable. ADRENAL GLANDS:  Unremarkable. KIDNEYS/URETERS: Symmetric nephrographic phase enhancement of the kidneys. No obstructive uropathy STOMACH AND BOWEL: Small hiatal hernia. Inflamed diverticuli in the sigmoid colon and adjacent mesenteric fat stranding. Underlying colonic wall thickening. Questionable polypoid lesion within the lumen of the proximal sigmoid colon. No evidence of bowel  obstruction. APPENDIX:  No findings to suggest appendicitis. ABDOMINOPELVIC CAVITY:  No free intraperitoneal air or fluid collection. VESSELS: No abdominal aortic aneurysm. PELVIS REPRODUCTIVE ORGANS: No prostate enlargement. URINARY BLADDER:  Unremarkable. ABDOMINAL WALL/INGUINAL REGIONS:  Unremarkable. OSSEOUS STRUCTURES:  No acute fracture or destructive osseous lesion. Impression: Sigmoid diverticulitis and colitis. No evidence of bowel perforation or abscess. Questionable polypoid lesion within the lumen of the proximal sigmoid colon. Recommend follow-up CT with oral contrast and/or colonoscopy, when medically improved, to exclude underlying neoplasm. Workstation performed: [id number]   Answers submitted by the patient for this visit:  Abdominal Pain Questionnaire (Submitted on 11/27/2023)  Chief Complaint: Abdominal pain  Chronicity: new  Onset: 1 to 4 weeks ago  Onset quality: sudden  Frequency: rarely  Episode duration: 2 Days  Progression since onset: gradually worsening  Pain location: LLQ  Pain - numeric: 6/10  Pain quality: dull, sharp  Radiates to: pelvis, perineum  anorexia: No  arthralgias: No  belching: No  constipation: No  diarrhea: Yes  dysuria: No  fever: Yes  flatus: No  frequency: No  headaches: No  hematochezia: Yes  hematuria: No  melena: No  myalgias: Yes  nausea:  No  weight loss: No  vomiting: No  Aggravated by: certain positions  Relieved by: certain positions, standing  Diagnostic workup: CT scan

## 2024-01-02 ENCOUNTER — ANESTHESIA EVENT (OUTPATIENT)
Dept: ANESTHESIOLOGY | Facility: HOSPITAL | Age: 44
End: 2024-01-02

## 2024-01-02 ENCOUNTER — ANESTHESIA (OUTPATIENT)
Dept: ANESTHESIOLOGY | Facility: HOSPITAL | Age: 44
End: 2024-01-02

## 2024-01-04 ENCOUNTER — TELEPHONE (OUTPATIENT)
Age: 44
End: 2024-01-04

## 2024-01-04 DIAGNOSIS — K57.92 ACUTE DIVERTICULITIS: Primary | ICD-10-CM

## 2024-01-04 RX ORDER — SODIUM PICOSULFATE, MAGNESIUM OXIDE, AND ANHYDROUS CITRIC ACID 12; 3.5; 1 G/175ML; G/175ML; MG/175ML
LIQUID ORAL
Qty: 350 ML | Refills: 0 | Status: SHIPPED | OUTPATIENT
Start: 2024-01-04

## 2024-01-04 NOTE — TELEPHONE ENCOUNTER
Patients GI provider:  Violeta Montemayor    Number to return call: [phone]     Reason for call: Pt calling requesting Clenpiq to be called into pharmacy on file. Pharmacy verified.    Scheduled procedure/appointment date if applicable: 1/16/2024 COLON/EGD

## 2024-01-04 NOTE — TELEPHONE ENCOUNTER
Called to inform patient Clenpiq was sent to his pharmacy as requested. Instructed to call with any further questions or concerns.

## 2024-01-11 ENCOUNTER — TELEPHONE (OUTPATIENT)
Dept: GASTROENTEROLOGY | Facility: CLINIC | Age: 44
End: 2024-01-11

## 2024-01-11 NOTE — TELEPHONE ENCOUNTER
Spoke with patient and confirmed his procedures for 1/16. His wife is his  and he will be called day prior with arrival time. He had the instructions for Clenpiq but pharmacy wouldn't release prescription because insurance not covering. I sent him the instructions for the Miralax, Dulcolax prep since he already has most of the ingredients.

## 2024-01-16 ENCOUNTER — ANESTHESIA (OUTPATIENT)
Dept: GASTROENTEROLOGY | Facility: AMBULARY SURGERY CENTER | Age: 44
End: 2024-01-16

## 2024-01-16 ENCOUNTER — ANESTHESIA EVENT (OUTPATIENT)
Dept: GASTROENTEROLOGY | Facility: AMBULARY SURGERY CENTER | Age: 44
End: 2024-01-16

## 2024-01-16 ENCOUNTER — HOSPITAL ENCOUNTER (OUTPATIENT)
Dept: GASTROENTEROLOGY | Facility: AMBULARY SURGERY CENTER | Age: 44
Setting detail: OUTPATIENT SURGERY
Discharge: HOME | End: 2024-01-16
Payer: COMMERCIAL

## 2024-01-16 VITALS
DIASTOLIC BLOOD PRESSURE: 75 MMHG | TEMPERATURE: 97 F | HEART RATE: 77 BPM | WEIGHT: 142 LBS | BODY MASS INDEX: 20.33 KG/M2 | HEIGHT: 70 IN | RESPIRATION RATE: 17 BRPM | OXYGEN SATURATION: 98 % | SYSTOLIC BLOOD PRESSURE: 107 MMHG

## 2024-01-16 DIAGNOSIS — K63.5 POLYP OF SIGMOID COLON, UNSPECIFIED TYPE: ICD-10-CM

## 2024-01-16 DIAGNOSIS — K21.9 GASTROESOPHAGEAL REFLUX DISEASE, UNSPECIFIED WHETHER ESOPHAGITIS PRESENT: ICD-10-CM

## 2024-01-16 DIAGNOSIS — K57.92 ACUTE DIVERTICULITIS: ICD-10-CM

## 2024-01-16 PROCEDURE — 45385 COLONOSCOPY W/LESION REMOVAL: CPT | Performed by: INTERNAL MEDICINE

## 2024-01-16 PROCEDURE — 43239 EGD BIOPSY SINGLE/MULTIPLE: CPT | Performed by: INTERNAL MEDICINE

## 2024-01-16 PROCEDURE — 88305 TISSUE EXAM BY PATHOLOGIST: CPT | Performed by: PATHOLOGY

## 2024-01-16 PROCEDURE — 45381 COLONOSCOPY SUBMUCOUS NJX: CPT | Performed by: INTERNAL MEDICINE

## 2024-01-16 RX ORDER — LIDOCAINE HYDROCHLORIDE 20 MG/ML
INJECTION, SOLUTION EPIDURAL; INFILTRATION; INTRACAUDAL; PERINEURAL AS NEEDED
Status: DISCONTINUED | OUTPATIENT
Start: 2024-01-16 | End: 2024-01-16

## 2024-01-16 RX ORDER — PANTOPRAZOLE SODIUM 40 MG/1
40 TABLET, DELAYED RELEASE ORAL 2 TIMES DAILY
Qty: 60 TABLET | Refills: 3 | Status: SHIPPED | OUTPATIENT
Start: 2024-01-16

## 2024-01-16 RX ORDER — PROPOFOL 10 MG/ML
INJECTION, EMULSION INTRAVENOUS AS NEEDED
Status: DISCONTINUED | OUTPATIENT
Start: 2024-01-16 | End: 2024-01-16

## 2024-01-16 RX ORDER — PROPOFOL 10 MG/ML
INJECTION, EMULSION INTRAVENOUS CONTINUOUS PRN
Status: DISCONTINUED | OUTPATIENT
Start: 2024-01-16 | End: 2024-01-16

## 2024-01-16 RX ORDER — SODIUM CHLORIDE, SODIUM LACTATE, POTASSIUM CHLORIDE, CALCIUM CHLORIDE 600; 310; 30; 20 MG/100ML; MG/100ML; MG/100ML; MG/100ML
INJECTION, SOLUTION INTRAVENOUS CONTINUOUS PRN
Status: DISCONTINUED | OUTPATIENT
Start: 2024-01-16 | End: 2024-01-16

## 2024-01-16 RX ORDER — FENTANYL CITRATE 50 UG/ML
INJECTION, SOLUTION INTRAMUSCULAR; INTRAVENOUS AS NEEDED
Status: DISCONTINUED | OUTPATIENT
Start: 2024-01-16 | End: 2024-01-16

## 2024-01-16 RX ADMIN — PROPOFOL 130 MCG/KG/MIN: 10 INJECTION, EMULSION INTRAVENOUS at 14:28

## 2024-01-16 RX ADMIN — PROPOFOL 50 MG: 10 INJECTION, EMULSION INTRAVENOUS at 14:39

## 2024-01-16 RX ADMIN — SODIUM CHLORIDE, SODIUM LACTATE, POTASSIUM CHLORIDE, AND CALCIUM CHLORIDE: .6; .31; .03; .02 INJECTION, SOLUTION INTRAVENOUS at 14:25

## 2024-01-16 RX ADMIN — PROPOFOL 70 MG: 10 INJECTION, EMULSION INTRAVENOUS at 14:28

## 2024-01-16 RX ADMIN — LIDOCAINE HYDROCHLORIDE 100 MG: 20 INJECTION, SOLUTION EPIDURAL; INFILTRATION; INTRACAUDAL; PERINEURAL at 14:28

## 2024-01-16 RX ADMIN — FENTANYL CITRATE 50 MCG: 50 INJECTION INTRAMUSCULAR; INTRAVENOUS at 14:27

## 2024-01-16 NOTE — H&P
"History and Physical -  [facility]  Per Campos 43 y.o. male MRN: [id number]    HPI: Per Campos is a 43 y.o. year old male who presents with GERD, follow up diverticultis and abnormal CT      Review of Systems    Historical Information   Past Medical History:   Diagnosis Date    Diverticulitis     Diverticulosis      Past Surgical History:   Procedure Laterality Date    VASECTOMY       Social History   Social History     Substance and Sexual Activity   Alcohol Use Yes    Comment: socially     Social History     Substance and Sexual Activity   Drug Use Not Currently     Social History     Tobacco Use   Smoking Status Never   Smokeless Tobacco Never     History reviewed. No pertinent family history.    Meds/Allergies     (Not in a hospital admission)      No Known Allergies    Objective     /87   Pulse 77   Temp (!) 96.9 °F (36.1 °C) (Temporal)   Resp 19   Ht 5' 10\" (1.778 m)   Wt 64.4 kg (142 lb)   SpO2 97%   BMI 20.37 kg/m²       PHYSICAL EXAM    Gen: NAD  CV: RRR  CHEST: Clear  ABD: soft, NT/ND  EXT: no edema  Neuro: AAO      ASSESSMENT/PLAN:  This is a 43 y.o. year old male here for GERD, follow up diverticultis and abnormal CT    PLAN:   Procedure: egd/colonoscopy      "

## 2024-01-16 NOTE — ANESTHESIA POSTPROCEDURE EVALUATION
Post-Op Assessment Note    CV Status:  Stable    Pain management: adequate       Mental Status:  Alert and awake   Hydration Status:  Euvolemic   PONV Controlled:  Controlled   Airway Patency:  Patent     Post Op Vitals Reviewed: Yes      Staff: CRNA               /69 (01/16/24 1502)    Temp (!) 97 °F (36.1 °C) (01/16/24 1502)    Pulse 81 (01/16/24 1502)   Resp 16 (01/16/24 1502)    SpO2 99 % (01/16/24 1502)

## 2024-01-16 NOTE — ANESTHESIA PREPROCEDURE EVALUATION
Procedure:  EGD  COLONOSCOPY    Relevant Problems   No relevant active problems        Physical Exam    Airway    Mallampati score: I  TM Distance: >3 FB  Neck ROM: full     Dental   No notable dental hx     Cardiovascular      Pulmonary      Other Findings        Anesthesia Plan  ASA Score- 1     Anesthesia Type- IV sedation with anesthesia with ASA Monitors.         Additional Monitors:     Airway Plan:            Plan Factors-Exercise tolerance (METS): >4 METS.    Chart reviewed.   Existing labs reviewed. Patient summary reviewed.                  Induction- intravenous.    Postoperative Plan-     Informed Consent- Anesthetic plan and risks discussed with patient.  I personally reviewed this patient with the CRNA. Discussed and agreed on the Anesthesia Plan with the CRNA..

## 2024-01-18 PROCEDURE — 88305 TISSUE EXAM BY PATHOLOGIST: CPT | Performed by: PATHOLOGY

## 2024-01-19 ENCOUNTER — TELEPHONE (OUTPATIENT)
Age: 44
End: 2024-01-19

## 2024-01-19 ENCOUNTER — PREP FOR PROCEDURE (OUTPATIENT)
Age: 44
End: 2024-01-19

## 2024-01-19 DIAGNOSIS — Z86.010 HISTORY OF COLON POLYPS: Primary | ICD-10-CM

## 2024-01-19 DIAGNOSIS — K21.9 GASTROESOPHAGEAL REFLUX DISEASE, UNSPECIFIED WHETHER ESOPHAGITIS PRESENT: ICD-10-CM

## 2024-01-19 NOTE — TELEPHONE ENCOUNTER
Scheduled date of EGD/colonoscopy (as of today): 04/29/2024  Physician performing EGD/colonoscopy: Dr. Youssef  Location of EGD[facility]colonoscopy: [facility]  Desired bowel prep reviewed with patient: THERESA/DUL  Prep instructions sent via [employer]  Instructions reviewed with patient by: ANITHA  Clearances: passed [facility], ask again closer to proced date

## 2024-01-22 ENCOUNTER — TELEPHONE (OUTPATIENT)
Dept: GASTROENTEROLOGY | Facility: CLINIC | Age: 44
End: 2024-01-22

## 2024-01-22 NOTE — TELEPHONE ENCOUNTER
----- Message from Darwin Youssef MD sent at 1/19/2024  3:33 PM EST -----  The biopsies from the stomach were negative for H. pylori.  As we discussed he had severe acid reflux changes, repeat upper endoscopy 3 months time.    The small polyp was a tubular adenoma, the larger polyp was a tubular adenoma with prolapse changes.  This is why the polyp was large.  These are precancerous polyp, there is no high-grade dysplasia and no cancer.    The polyp appears to be completely removed.    As we discussed, recommend repeat colonoscopy at the same time as a next upper endoscopy to make sure that entire polyp has been removed.    Please call with any questions or concerns.       53 y/o F w/PMH asthma and EtOH abuse, presents with right knee pain 2/2 trauma, found to have depressed fracture of the lateral tibial plateau and avulsion of the medial tibial spine on CT and unable to ambulate, admitted for PT eval. 55 y/o F w/PMH asthma and EtOH abuse, presents with right knee pain 2/2 trauma, found to have depressed fracture of the lateral tibial plateau and avulsion of the medial tibial spine on CT and unable to ambulate, admitted for PT eval. 55 y/o F w/PMH asthma and EtOH abuse, presents with right knee pain 2/2 trauma, found to have depressed fracture of the lateral tibial plateau and avulsion of the medial tibial spine on CT and unable to ambulate, admitted for PT eval. 55 y/o F w/PMH asthma and EtOH abuse, presents with right knee pain 2/2 trauma, found to have depressed fracture of the lateral tibial plateau and avulsion of the medial tibial spine on CT and unable to ambulate, admitted for PT eval. 55 y/o F w/

## 2024-01-22 NOTE — TELEPHONE ENCOUNTER
Called and spoke with patient. Informed him that Dr. Youssef added an EGD to his colonoscopy on 4/29/24.

## 2024-02-08 DIAGNOSIS — K21.9 GASTROESOPHAGEAL REFLUX DISEASE, UNSPECIFIED WHETHER ESOPHAGITIS PRESENT: ICD-10-CM

## 2024-02-08 RX ORDER — PANTOPRAZOLE SODIUM 40 MG/1
40 TABLET, DELAYED RELEASE ORAL 2 TIMES DAILY
Qty: 180 TABLET | Refills: 1 | Status: SHIPPED | OUTPATIENT
Start: 2024-02-08

## 2024-04-18 DIAGNOSIS — K21.9 GASTROESOPHAGEAL REFLUX DISEASE, UNSPECIFIED WHETHER ESOPHAGITIS PRESENT: ICD-10-CM

## 2024-04-18 RX ORDER — PANTOPRAZOLE SODIUM 40 MG/1
40 TABLET, DELAYED RELEASE ORAL 2 TIMES DAILY
Qty: 180 TABLET | Refills: 1 | Status: SHIPPED | OUTPATIENT
Start: 2024-04-18

## 2024-04-24 ENCOUNTER — TELEPHONE (OUTPATIENT)
Dept: GASTROENTEROLOGY | Facility: CLINIC | Age: 44
End: 2024-04-24

## 2024-04-24 NOTE — TELEPHONE ENCOUNTER
Spoke with pt confirming his procedure for 4/29 with Dr. Youssef. He has his  and prep instructions. He will be called this Friday with his arrival time.

## 2024-04-29 ENCOUNTER — ANESTHESIA EVENT (OUTPATIENT)
Dept: GASTROENTEROLOGY | Facility: AMBULARY SURGERY CENTER | Age: 44
End: 2024-04-29

## 2024-04-29 ENCOUNTER — ANESTHESIA (OUTPATIENT)
Dept: GASTROENTEROLOGY | Facility: AMBULARY SURGERY CENTER | Age: 44
End: 2024-04-29

## 2024-04-29 ENCOUNTER — HOSPITAL ENCOUNTER (OUTPATIENT)
Dept: GASTROENTEROLOGY | Facility: AMBULARY SURGERY CENTER | Age: 44
Setting detail: OUTPATIENT SURGERY
Discharge: HOME | End: 2024-04-29
Attending: INTERNAL MEDICINE
Payer: COMMERCIAL

## 2024-04-29 VITALS
HEART RATE: 81 BPM | OXYGEN SATURATION: 98 % | SYSTOLIC BLOOD PRESSURE: 108 MMHG | WEIGHT: 144 LBS | TEMPERATURE: 97 F | RESPIRATION RATE: 18 BRPM | BODY MASS INDEX: 20.62 KG/M2 | DIASTOLIC BLOOD PRESSURE: 76 MMHG | HEIGHT: 70 IN

## 2024-04-29 DIAGNOSIS — Z86.010 HISTORY OF COLON POLYPS: ICD-10-CM

## 2024-04-29 DIAGNOSIS — K21.9 GASTROESOPHAGEAL REFLUX DISEASE, UNSPECIFIED WHETHER ESOPHAGITIS PRESENT: ICD-10-CM

## 2024-04-29 PROCEDURE — 43239 EGD BIOPSY SINGLE/MULTIPLE: CPT | Performed by: INTERNAL MEDICINE

## 2024-04-29 PROCEDURE — 45385 COLONOSCOPY W/LESION REMOVAL: CPT | Performed by: INTERNAL MEDICINE

## 2024-04-29 PROCEDURE — 45380 COLONOSCOPY AND BIOPSY: CPT | Performed by: INTERNAL MEDICINE

## 2024-04-29 PROCEDURE — 88305 TISSUE EXAM BY PATHOLOGIST: CPT | Performed by: STUDENT IN AN ORGANIZED HEALTH CARE EDUCATION/TRAINING PROGRAM

## 2024-04-29 RX ORDER — PANTOPRAZOLE SODIUM 40 MG/1
40 TABLET, DELAYED RELEASE ORAL DAILY
Qty: 90 TABLET | Refills: 3 | Status: SHIPPED | OUTPATIENT
Start: 2024-04-29

## 2024-04-29 RX ORDER — LIDOCAINE HYDROCHLORIDE 20 MG/ML
INJECTION, SOLUTION EPIDURAL; INFILTRATION; INTRACAUDAL; PERINEURAL AS NEEDED
Status: DISCONTINUED | OUTPATIENT
Start: 2024-04-29 | End: 2024-04-29

## 2024-04-29 RX ORDER — PHENYLEPHRINE HCL IN 0.9% NACL 1 MG/10 ML
SYRINGE (ML) INTRAVENOUS AS NEEDED
Status: DISCONTINUED | OUTPATIENT
Start: 2024-04-29 | End: 2024-04-29

## 2024-04-29 RX ORDER — SODIUM CHLORIDE, SODIUM LACTATE, POTASSIUM CHLORIDE, CALCIUM CHLORIDE 600; 310; 30; 20 MG/100ML; MG/100ML; MG/100ML; MG/100ML
INJECTION, SOLUTION INTRAVENOUS CONTINUOUS PRN
Status: DISCONTINUED | OUTPATIENT
Start: 2024-04-29 | End: 2024-04-29

## 2024-04-29 RX ORDER — PROPOFOL 10 MG/ML
INJECTION, EMULSION INTRAVENOUS AS NEEDED
Status: DISCONTINUED | OUTPATIENT
Start: 2024-04-29 | End: 2024-04-29

## 2024-04-29 RX ADMIN — PROPOFOL 50 MG: 10 INJECTION, EMULSION INTRAVENOUS at 11:46

## 2024-04-29 RX ADMIN — LIDOCAINE HYDROCHLORIDE 90 MG: 20 INJECTION, SOLUTION EPIDURAL; INFILTRATION; INTRACAUDAL at 11:34

## 2024-04-29 RX ADMIN — PROPOFOL 40 MG: 10 INJECTION, EMULSION INTRAVENOUS at 11:36

## 2024-04-29 RX ADMIN — SODIUM CHLORIDE, SODIUM LACTATE, POTASSIUM CHLORIDE, AND CALCIUM CHLORIDE: .6; .31; .03; .02 INJECTION, SOLUTION INTRAVENOUS at 10:58

## 2024-04-29 RX ADMIN — PROPOFOL 20 MG: 10 INJECTION, EMULSION INTRAVENOUS at 11:42

## 2024-04-29 RX ADMIN — PROPOFOL 130 MG: 10 INJECTION, EMULSION INTRAVENOUS at 11:34

## 2024-04-29 RX ADMIN — Medication 100 MCG: at 11:52

## 2024-04-29 RX ADMIN — Medication 100 MCG: at 11:43

## 2024-04-29 RX ADMIN — PROPOFOL 30 MG: 10 INJECTION, EMULSION INTRAVENOUS at 11:37

## 2024-04-29 NOTE — H&P
"History and Physical -  [facility]  Per Campos 44 y.o. male MRN: [id number]    HPI: Per Campos is a 44 y.o. year old male who presents with follow up severe esophagitis, large colon polyp      Review of Systems    Historical Information   Past Medical History:   Diagnosis Date    Colon polyp     Diverticulitis     Diverticulosis      Past Surgical History:   Procedure Laterality Date    COLONOSCOPY      VASECTOMY       Social History   Social History     Substance and Sexual Activity   Alcohol Use Yes    Comment: socially     Social History     Substance and Sexual Activity   Drug Use Not Currently     Social History     Tobacco Use   Smoking Status Never   Smokeless Tobacco Never     History reviewed. No pertinent family history.    Meds/Allergies     (Not in a hospital admission)      No Known Allergies    Objective     /78   Pulse 91   Temp (!) 97.3 °F (36.3 °C) (Temporal)   Resp 18   Ht 5' 10\" (1.778 m)   Wt 65.3 kg (144 lb)   SpO2 96%   BMI 20.66 kg/m²       PHYSICAL EXAM    Gen: NAD  CV: RRR  CHEST: Clear  ABD: soft, NT/ND  EXT: no edema  Neuro: AAO      ASSESSMENT/PLAN:  This is a 44 y.o. year old male here for follow up severe esophagitis, large colon polyp    PLAN:   Procedure: egd/colonocopy        "

## 2024-04-29 NOTE — ANESTHESIA PREPROCEDURE EVALUATION
Procedure:  COLONOSCOPY  EGD    Relevant Problems   No relevant active problems        Physical Exam    Airway    Mallampati score: I  TM Distance: <3 FB  Neck ROM: full     Dental   No notable dental hx     Cardiovascular  Rhythm: regular, Rate: normal, Cardiovascular exam normal    Pulmonary  Pulmonary exam normal Breath sounds clear to auscultation    Other Findings        Anesthesia Plan  ASA Score- 1     Anesthesia Type- IV sedation with anesthesia with ASA Monitors.         Additional Monitors:     Airway Plan:     Comment: Discussed risks/benefits, including medication reactions, awareness, aspiration, and serious/life threatening complications.    Plan to maintain native airway with IVGA, monitored with EtCO2.       Plan Factors-Exercise tolerance (METS): >4 METS.    Chart reviewed.    Patient summary reviewed.      Patient instructed to abstain from smoking on day of procedure. Patient did not smoke on day of surgery.            Induction- intravenous.    Postoperative Plan-     Informed Consent- Anesthetic plan and risks discussed with patient.  I personally reviewed this patient with the CRNA. Discussed and agreed on the Anesthesia Plan with the CRNA..

## 2024-04-29 NOTE — ANESTHESIA POSTPROCEDURE EVALUATION
Post-Op Assessment Note    CV Status:  Stable  Pain Score: 0    Pain management: adequate       Mental Status:  Alert and awake   Hydration Status:  Euvolemic   PONV Controlled:  Controlled   Airway Patency:  Patent     Post Op Vitals Reviewed: Yes    No anethesia notable event occurred.    Staff: CRNA               BP   107/53   Temp (!) 97 °F (36.1 °C) (04/29/24 1159)    Pulse 82 (04/29/24 1159)   Resp 18 (04/29/24 1159)    SpO2 97 % (04/29/24 1159)

## 2024-04-30 PROCEDURE — 88305 TISSUE EXAM BY PATHOLOGIST: CPT | Performed by: STUDENT IN AN ORGANIZED HEALTH CARE EDUCATION/TRAINING PROGRAM

## 2024-05-10 ENCOUNTER — TELEPHONE (OUTPATIENT)
Age: 44
End: 2024-05-10

## 2024-05-10 ENCOUNTER — APPOINTMENT (EMERGENCY)
Dept: RADIOLOGY | Facility: HOSPITAL | Age: 44
End: 2024-05-10
Payer: COMMERCIAL

## 2024-05-10 ENCOUNTER — HOSPITAL ENCOUNTER (EMERGENCY)
Facility: HOSPITAL | Age: 44
Discharge: HOME | End: 2024-05-10
Attending: EMERGENCY MEDICINE
Payer: COMMERCIAL

## 2024-05-10 VITALS
RESPIRATION RATE: 16 BRPM | SYSTOLIC BLOOD PRESSURE: 120 MMHG | TEMPERATURE: 97.7 F | DIASTOLIC BLOOD PRESSURE: 87 MMHG | OXYGEN SATURATION: 97 % | HEART RATE: 82 BPM

## 2024-05-10 DIAGNOSIS — S60.221A CONTUSION OF RIGHT HAND, INITIAL ENCOUNTER: ICD-10-CM

## 2024-05-10 DIAGNOSIS — S62.309A METACARPAL BONE FRACTURE: Primary | ICD-10-CM

## 2024-05-10 PROCEDURE — 99284 EMERGENCY DEPT VISIT MOD MDM: CPT | Performed by: EMERGENCY MEDICINE

## 2024-05-10 PROCEDURE — 29125 APPL SHORT ARM SPLINT STATIC: CPT | Performed by: EMERGENCY MEDICINE

## 2024-05-10 PROCEDURE — 99283 EMERGENCY DEPT VISIT LOW MDM: CPT

## 2024-05-10 PROCEDURE — 73130 X-RAY EXAM OF HAND: CPT

## 2024-05-10 RX ORDER — ACETAMINOPHEN 160 MG/5ML
650 SUSPENSION ORAL ONCE
Status: COMPLETED | OUTPATIENT
Start: 2024-05-10 | End: 2024-05-10

## 2024-05-10 RX ADMIN — ACETAMINOPHEN 650 MG: 650 SUSPENSION ORAL at 14:53

## 2024-05-10 NOTE — ED ATTENDING ATTESTATION
5/10/2024  I, Satish Reed MD, saw and evaluated the patient. I have discussed the patient with the resident/non-physician practitioner and agree with the resident's/non-physician practitioner's findings, Plan of Care, and MDM as documented in the resident's/non-physician practitioner's note, except where noted. All available labs and Radiology studies were reviewed.  I was present for key portions of any procedure(s) performed by the resident/non-physician practitioner and I was immediately available to provide assistance.       At this point I agree with the current assessment done in the Emergency Department.  I have conducted an independent evaluation of this patient a history and physical is as follows:    ED Course         Critical Care Time  Procedures    43 yo male with right hand pain after slamming door onto it today. Pt with no previous injury to hand. Pt with swelling, some limited rom due to pain.  No numbness, tingling, weakness.  Vss, afebrile, lungs cta, rrr, right hand mild swelling, tenderness over 5th metacarpal, nvi, nvi, full rom of fingers.  Xray, probable fx, splint, hand referral.

## 2024-05-10 NOTE — Clinical Note
Per Campos was seen and treated in our emergency department on 5/10/2024.                Diagnosis:     Per  .    He may return on this date: 05/13/2024         If you have any questions or concerns, please don't hesitate to call.      Annette Maria Palladino, DO    ______________________________           _______________          _______________  Hospital Representative                              Date                                Time

## 2024-05-10 NOTE — ED PROVIDER NOTES
History  Chief Complaint   Patient presents with    Hand Injury     Car door slammed on pt's right hand today. Pt's noticed some swelling in hand. Denies any numbness/tingling.      Patient is a 44-year-old male presenting to the emergency department for evaluation of right hand pain.  Patient slammed a car door on his hand.  Patient noted swelling of the hand.  Denies any current numbness or tingling in the extremity wanted to come in for further evaluation.  No other injuries.        Prior to Admission Medications   Prescriptions Last Dose Informant Patient Reported? Taking?   pantoprazole (PROTONIX) 40 mg tablet   No No   Sig: Take 1 tablet (40 mg total) by mouth daily      Facility-Administered Medications: None       Past Medical History:   Diagnosis Date    Colon polyp     Diverticulitis     Diverticulosis        Past Surgical History:   Procedure Laterality Date    COLONOSCOPY      VASECTOMY         History reviewed. No pertinent family history.  I have reviewed and agree with the history as documented.    E-Cigarette/Vaping    E-Cigarette Use Never User      E-Cigarette/Vaping Substances     Social History     Tobacco Use    Smoking status: Never    Smokeless tobacco: Never   Vaping Use    Vaping status: Never Used   Substance Use Topics    Alcohol use: Yes     Comment: socially    Drug use: Not Currently        Review of Systems   Constitutional:  Negative for activity change, chills and fever.   HENT:  Negative for congestion, ear pain and sore throat.    Eyes:  Negative for pain and visual disturbance.   Respiratory:  Negative for cough, chest tightness and shortness of breath.    Cardiovascular:  Negative for chest pain and palpitations.   Gastrointestinal:  Negative for abdominal pain, constipation, diarrhea, nausea and vomiting.   Genitourinary:  Negative for dysuria and hematuria.   Musculoskeletal:  Negative for arthralgias and back pain.        Right hand swelling   Skin:  Negative for color change  and rash.   Neurological:  Negative for seizures, syncope, weakness and light-headedness.   Psychiatric/Behavioral:  Negative for agitation and behavioral problems.    All other systems reviewed and are negative.      Physical Exam  ED Triage Vitals [05/10/24 1430]   Temperature Pulse Respirations Blood Pressure SpO2   97.7 °F (36.5 °C) 82 16 120/87 97 %      Temp Source Heart Rate Source Patient Position - Orthostatic VS BP Location FiO2 (%)   Temporal Monitor -- Left arm --      Pain Score       2             Orthostatic Vital Signs  Vitals:    05/10/24 1430   BP: 120/87   Pulse: 82       Physical Exam  Vitals and nursing note reviewed.   Constitutional:       General: He is not in acute distress.     Appearance: He is well-developed.   HENT:      Head: Normocephalic and atraumatic.      Right Ear: External ear normal.      Left Ear: External ear normal.      Nose: Nose normal.      Mouth/Throat:      Mouth: Mucous membranes are moist.   Eyes:      Extraocular Movements: Extraocular movements intact.      Conjunctiva/sclera: Conjunctivae normal.   Cardiovascular:      Rate and Rhythm: Normal rate and regular rhythm.      Heart sounds: Normal heart sounds. No murmur heard.  Pulmonary:      Effort: Pulmonary effort is normal. No respiratory distress.      Breath sounds: Normal breath sounds.   Abdominal:      General: Abdomen is flat.      Palpations: Abdomen is soft.      Tenderness: There is no abdominal tenderness.   Musculoskeletal:         General: No swelling.      Right forearm: Normal.      Left forearm: Normal.      Right wrist: Normal.      Left wrist: Normal.      Right hand: Swelling and bony tenderness present. Decreased range of motion. Normal strength. Normal sensation. There is no disruption of two-point discrimination. Normal capillary refill. Normal pulse.      Left hand: Normal.      Cervical back: Normal range of motion and neck supple.      Comments: Bony tenderness over the fourth and fifth  metacarpal on the right near the base of the wrist   Skin:     General: Skin is warm and dry.      Capillary Refill: Capillary refill takes less than 2 seconds.   Neurological:      General: No focal deficit present.      Mental Status: He is alert and oriented to person, place, and time.   Psychiatric:         Mood and Affect: Mood normal.         ED Medications  Medications   acetaminophen (TYLENOL) oral suspension 650 mg (650 mg Oral Given 5/10/24 6803)       Diagnostic Studies  Results Reviewed       None                   XR hand 3+ views RIGHT   ED Interpretation by Annette Maria Palladino, DO (05/10 1522)   5th metacrapal fx            Procedures  Orthopedic injury treatment    Date/Time: 5/10/2024 3:35 PM    Performed by: Annette Maria Palladino, DO  Authorized by: Annette Maria Palladino, DO    Patient Location:  ED  Universal Protocol:  Consent given by: patient  Patient understanding: patient states understanding of the procedure being performed  Patient identity confirmed: verbally with patient    Injury location:  Hand  Location details:  Right hand  Injury type:  Fracture  Fracture type: fifth metacarpal    Neurovascular status: Neurovascularly intact    Distal perfusion: normal    Neurological function: normal    Range of motion: normal    Local anesthesia used?: No    General anesthesia used?: No    Manipulation performed?: No    Immobilization:  Splint  Splint type:  Ulnar gutter  Supplies used:  Cotton padding, elastic bandage and Ortho-Glass  Neurovascular status: Neurovascularly intact    Distal perfusion: normal    Neurological function: normal    Range of motion: normal    Patient tolerance:  Patient tolerated the procedure well with no immediate complications        ED Course  ED Course as of 05/10/24 1537   Fri May 10, 2024   1445 Blood Pressure: 120/87   1445 Temperature: 97.7 °F (36.5 °C)   1445 Temp Source: Temporal   1445 Pulse: 82   1445 Respirations: 16   1445 SpO2: 97 %                              SBIRT 20yo+      Flowsheet Row Most Recent Value   Initial Alcohol Screen: US AUDIT-C     1. How often do you have a drink containing alcohol? 0 Filed at: 05/10/2024 1429   2. How many drinks containing alcohol do you have on a typical day you are drinking?  0 Filed at: 05/10/2024 1429   3a. Male UNDER 65: How often do you have five or more drinks on one occasion? 0 Filed at: 05/10/2024 1429   3b. FEMALE Any Age, or MALE 65+: How often do you have 4 or more drinks on one occassion? 0 Filed at: 05/10/2024 1429   Audit-C Score 0 Filed at: 05/10/2024 1429   ALOK: How many times in the past year have you...    Used an illegal drug or used a prescription medication for non-medical reasons? Never Filed at: 05/10/2024 1429                  Medical Decision Making  Patient with fifth metacarpal fracture on my interpretation of the x-ray.  Ulnar gutter was given for splinting.  Patient tolerated splint without difficulty neurovascularly intact before and after splinting.  Advised to follow-up with orthopedics for further evaluation.  Tylenol Motrin for symptomatic relief.  PCP follow-up advised.  Return precautions given.  Patient is aware no questions or concerns he stable for discharge.    Amount and/or Complexity of Data Reviewed  Radiology: ordered and independent interpretation performed.    Risk  OTC drugs.          Disposition  Final diagnoses:   Contusion of right hand, initial encounter   Metacarpal bone fracture     Time reflects when diagnosis was documented in both MDM as applicable and the Disposition within this note       Time User Action Codes Description Comment    5/10/2024  2:48 PM Palladino, Annette Add [S60.221A] Contusion of right hand, initial encounter     5/10/2024  3:22 PM Palladino, Annette Add [S62.309A] Metacarpal bone fracture     5/10/2024  3:22 PM Palladino, Annette Modify [S60.221A] Contusion of right hand, initial encounter     5/10/2024  3:22 PM Palladino,  Marion Nguyen [S62.309A] Metacarpal bone fracture           ED Disposition       ED Disposition   Discharge    Condition   Stable    Date/Time   Fri May 10, 2024 1448    Comment   Per Campos discharge to home/self care.                   Follow-up Information       Follow up With Specialties Details Why Contact Info Additional Information    Artie Gilmore MD Internal Medicine Schedule an appointment as soon as possible for a visit  for follow up [address] [place] [address]  [phone]       Ger Foster MD Orthopedic Surgery, Hand Surgery Schedule an appointment as soon as possible for a visit   [address] [facility] [id number] [phone]       [facility] Emergency Department Emergency Medicine Schedule an appointment as soon as possible for a visit  for follow up [address] [place] [id number] [phone] [facility] Emergency Department, [address] [place] [address], [address] [phone]            Patient's Medications   Discharge Prescriptions    No medications on file     No discharge procedures on file.    PDMP Review       None             ED Provider  Attending physically available and evaluated Per Campos. I managed the patient along with the ED Attending.    Electronically Signed by           Annette Maria Palladino, DO  05/10/24 6512

## 2024-05-10 NOTE — TELEPHONE ENCOUNTER
Hello,  Please advise if the following patient can be forced onto the schedule:    Patient: Per Campos    : 78    MRN: [id number]    Call back #: [phone]    Insurance: [employer]    Reason for appointment: right fifth metacarpal fx, SL xray     Requested doctor and/or location: [place]      Thank you.

## 2024-05-14 ENCOUNTER — OFFICE VISIT (OUTPATIENT)
Dept: OBGYN CLINIC | Facility: CLINIC | Age: 44
End: 2024-05-14
Payer: COMMERCIAL

## 2024-05-14 VITALS — BODY MASS INDEX: 20.62 KG/M2 | WEIGHT: 144 LBS | HEIGHT: 70 IN

## 2024-05-14 DIAGNOSIS — S62.326A CLOSED DISPLACED FRACTURE OF SHAFT OF FIFTH METACARPAL BONE OF RIGHT HAND, INITIAL ENCOUNTER: Primary | ICD-10-CM

## 2024-05-14 PROBLEM — M79.641 PAIN OF RIGHT HAND: Status: ACTIVE | Noted: 2024-05-14

## 2024-05-14 PROCEDURE — 99204 OFFICE O/P NEW MOD 45 MIN: CPT | Performed by: STUDENT IN AN ORGANIZED HEALTH CARE EDUCATION/TRAINING PROGRAM

## 2024-05-14 NOTE — PROGRESS NOTES
ORTHOPAEDIC HAND, WRIST, AND ELBOW OFFICE  VISIT      ASSESSMENT/PLAN:      Diagnoses and all orders for this visit:    Closed displaced fracture of shaft of fifth metacarpal bone of right hand, initial encounter  -     Cancel: Durable Medical Equipment  -     Durable Medical Equipment          44 y.o. male with right 5th metacarpal fx sustained 5/10/24.  Xrays reviewed with pt today.  Discussed how we can accept quite a bit of angulation with these fractures.   Treatment options and expected outcomes were discussed in the form of immobilization or surgical management.  Discussed risk of progressive displacement that may benefit from surgical management.  The patient verbalized understanding of exam findings and treatment plan.   The patient was given the opportunity to ask questions.  Questions were answered to the patient's satisfaction.  The patient decided to move forward with conservative management. He was provided with a TKO brace today that he is to wear like a cast.       Follow Up:  1 week       To Do Next Visit:  X-rays of the  right  hand with 10 degree supinated lateral view      Discussions:  Fracture - Nonoperative Care: The physiology of a fractured bone was discussed with the patient today.  With non-displaced or minimally displaced fractures, conservative treatment such as casting or splinting often results in a functional recovery.  Typically, these fractures are immobilized in either a cast or splint depending on the pattern.  Radiographs are typically taken at intervals throughout the fracture healing to ensure that reduction or alignment is not lost.  If the fracture loses its alignment, surgical intervention may be required to stabilize it.  Medical conditions such as diabetes, osteoporosis, vitamin D deficiency, and a history of or exposure to smoking may delay or prevent fracture healing. Options between cast/splint immobilization and surgical treatment were offered and the risks and  "benefits of both were discussed.       Lisandro Wong MD  Attending, Orthopaedic Surgery  Hand, Wrist, and Elbow Surgery  [facility]    ______________________________________________________________________________________________    CHIEF COMPLAINT:  Chief Complaint   Patient presents with   • Right Hand - Fracture       SUBJECTIVE:  Patient is a 44 y.o. RHD male who presents today for evaluation and treatment of right hand pain. Patient states he smacked his hand on center console in his car on Friday. He did go to the ED where xrays revealed a 5th metacarpal fx. Pt placed into a splint. Of note, pt states his ring finger felt like it was in a pretty flexed position in the splint.      Occupation: shipping/ at [employer]    I have personally reviewed all the relevant PMH, PSH, SH, FH, Medications and allergies      PAST MEDICAL HISTORY:  Past Medical History:   Diagnosis Date   • Colon polyp    • Diverticulitis    • Diverticulosis        PAST SURGICAL HISTORY:  Past Surgical History:   Procedure Laterality Date   • COLONOSCOPY     • VASECTOMY         FAMILY HISTORY:  History reviewed. No pertinent family history.    SOCIAL HISTORY:  Social History     Tobacco Use   • Smoking status: Never   • Smokeless tobacco: Never   Vaping Use   • Vaping status: Never Used   Substance Use Topics   • Alcohol use: Yes     Comment: socially   • Drug use: Not Currently       MEDICATIONS:    Current Outpatient Medications:   •  pantoprazole (PROTONIX) 40 mg tablet, Take 1 tablet (40 mg total) by mouth daily, Disp: 90 tablet, Rfl: 3    ALLERGIES:  No Known Allergies        REVIEW OF SYSTEMS:  Musculoskeletal:        As noted in HPI.   All other systems reviewed and are negative.    VITALS:  There were no vitals filed for this visit.    LABS:  HgA1c: No results found for: \"HGBA1C\"  BMP:   Lab Results   Component Value Date    CALCIUM 9.5 11/19/2023    K 3.7 11/19/2023    CO2 25 " 11/19/2023     11/19/2023    BUN 16 11/19/2023    CREATININE 1.05 11/19/2023       _____________________________________________________  PHYSICAL EXAMINATION:  General: Well developed and well nourished, alert & oriented x 3, appears comfortable  Psychiatric: Normal  HEENT: Normocephalic, Atraumatic Trachea Midline, No torticollis  Pulmonary: No audible wheezing or respiratory distress   Abdomen/GI: Non tender, non distended   Cardiovascular: No pitting edema, 2+ radial pulse   Skin: No masses, erythema, lacerations, fluctation, ulcerations  Neurovascular: Sensation Intact to the Median, Ulnar, Radial Nerve, Motor Intact to the Median, Ulnar, Radial Nerve, and Pulses Intact  Musculoskeletal: Normal, except as noted in detailed exam and in HPI.      MUSCULOSKELETAL EXAMINATION:  Right Hand:  Full dig extension of small finger.  Pulp to palm 1.5 cm.  No malrotation.  Nontender along ring metacarpal  Pt noted to have slight extensor lag of ring finger (likely from splint).  Brisk capillary refill.     ___________________________________________________  STUDIES REVIEWED:  Xrays of the right hand were reviewed and independently interpreted in PACS by Dr. Wong and demonstrate a fracture at the proximal shaft of pt's 5th metacarpal with some anterior translation.           PROCEDURES PERFORMED:  Procedures  No Procedures performed today    _____________________________________________________      Scribe Attestation    I,:  Jeanette Talbot PA-C am acting as a scribe while in the presence of the attending physician.:       I,:  Lisandro Wong MD personally performed the services described in this documentation    as scribed in my presence.:

## 2024-05-14 NOTE — LETTER
May 14, 2024     Patient: Per Campos  YOB: 1980  Date of Visit: 5/14/2024      To Whom it May Concern:    Per Campos is under my professional care. Per was seen in my office on 5/14/2024. Per can work with the following restrictions: no use of the right hand.    If you have any questions or concerns, please don't hesitate to call.         Sincerely,          Lisandro Wong MD        CC: No Recipients

## 2024-05-21 ENCOUNTER — APPOINTMENT (OUTPATIENT)
Dept: RADIOLOGY | Facility: AMBULARY SURGERY CENTER | Age: 44
End: 2024-05-21
Attending: STUDENT IN AN ORGANIZED HEALTH CARE EDUCATION/TRAINING PROGRAM
Payer: COMMERCIAL

## 2024-05-21 ENCOUNTER — OFFICE VISIT (OUTPATIENT)
Dept: OBGYN CLINIC | Facility: CLINIC | Age: 44
End: 2024-05-21
Payer: COMMERCIAL

## 2024-05-21 VITALS
WEIGHT: 144 LBS | HEART RATE: 71 BPM | HEIGHT: 70 IN | SYSTOLIC BLOOD PRESSURE: 136 MMHG | DIASTOLIC BLOOD PRESSURE: 88 MMHG | BODY MASS INDEX: 20.62 KG/M2

## 2024-05-21 DIAGNOSIS — S62.326A CLOSED DISPLACED FRACTURE OF SHAFT OF FIFTH METACARPAL BONE OF RIGHT HAND, INITIAL ENCOUNTER: Primary | ICD-10-CM

## 2024-05-21 DIAGNOSIS — S62.326A CLOSED DISPLACED FRACTURE OF SHAFT OF FIFTH METACARPAL BONE OF RIGHT HAND, INITIAL ENCOUNTER: ICD-10-CM

## 2024-05-21 PROCEDURE — 99213 OFFICE O/P EST LOW 20 MIN: CPT | Performed by: STUDENT IN AN ORGANIZED HEALTH CARE EDUCATION/TRAINING PROGRAM

## 2024-05-21 PROCEDURE — 73130 X-RAY EXAM OF HAND: CPT

## 2024-05-21 NOTE — PROGRESS NOTES
ORTHOPAEDIC HAND, WRIST, AND ELBOW OFFICE  VISIT      ASSESSMENT/PLAN:      Diagnoses and all orders for this visit:    Closed displaced fracture of shaft of fifth metacarpal bone of right hand, initial encounter  -     XR hand 3+ vw right; Future          44 y.o. male with right 5th metacarpal fx sustained 5/10/24.   Xrays reviewed with patient today.  Treatment options and expected outcomes were discussed. We discussed continued nonop care versus surgery. Discussed how surgery would make the alignment better, but not sure it would do much to improve his function. Discussed this may be treated differently by different surgeons.  The patient verbalized understanding of exam findings and treatment plan.   The patient was given the opportunity to ask questions.  Questions were answered to the patient's satisfaction.  The patient decided to move forward with continued nonoperative care. Will c/w TKO. Can remove this hygiene and to work on gentle ROM few times a day.  Elevate and ice for swelling.      Follow Up:  10-14 days      To Do Next Visit:  X-rays of the  right  hand with 10 degree supinated lateral view          Lisandro Wong MD  Attending, Orthopaedic Surgery  Hand, Wrist, and Elbow Surgery  [facility]    ______________________________________________________________________________________________    CHIEF COMPLAINT:  Chief Complaint   Patient presents with   • Right Hand - Fracture       SUBJECTIVE:  Patient is a 44 y.o. RHD male who presents today for follow up of right 5th metacarpal fx sustained 5/10/24. States noticeably improved Friday so has been off medication. He only takes his TKO off for showering.      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 No pertinent family history.    SOCIAL HISTORY:  Social History     Tobacco Use   • Smoking status: Never   • Smokeless tobacco: Never   Vaping Use   • Vaping status: Never Used   Substance Use Topics   • Alcohol use: Yes     Comment: socially   • Drug use: Not Currently       MEDICATIONS:    Current Outpatient Medications:   •  pantoprazole (PROTONIX) 40 mg tablet, Take 1 tablet (40 mg total) by mouth daily, Disp: 90 tablet, Rfl: 3    ALLERGIES:  No Known Allergies        REVIEW OF SYSTEMS:  Musculoskeletal:        As noted in HPI.   All other systems reviewed and are negative.    VITALS:  Vitals:    05/21/24 1010   BP: 136/88   Pulse: 71       LABS:  HgA1c: No results found for: \"HGBA1C\"  BMP:   Lab Results   Component Value Date    CALCIUM 9.5 11/19/2023    K 3.7 11/19/2023    CO2 25 11/19/2023     11/19/2023    BUN 16 11/19/2023    CREATININE 1.05 11/19/2023       _____________________________________________________  PHYSICAL EXAMINATION:  General: Well developed and well nourished, alert & oriented x 3, appears comfortable  Psychiatric: Normal  HEENT: Normocephalic, Atraumatic Trachea Midline, No torticollis  Pulmonary: No audible wheezing or respiratory distress   Abdomen/GI: Non tender, non distended   Cardiovascular: No pitting edema, 2+ radial pulse   Skin: No masses, erythema, lacerations, fluctation, ulcerations  Neurovascular: Sensation Intact to the Median, Ulnar, Radial Nerve, Motor Intact to the Median, Ulnar, Radial Nerve, and Pulses Intact  Musculoskeletal: Normal, except as noted in detailed exam and in HPI.      MUSCULOSKELETAL EXAMINATION:  Right Small Finger:  Full digit extension.  Pulp to palm 3cm.   No concerning malrotation.  Finger well-perfused.    ___________________________________________________  STUDIES " REVIEWED:  Xrays of the right hand were reviewed and independently interpreted in PACS by Dr. Wong and demonstrate similar alignment of pt's oblique metacarpal fracture compared to prior films.           PROCEDURES PERFORMED:  Procedures  No Procedures performed today    _____________________________________________________      Scribe Attestation    I,:  Jeanette Talbot PA-C am acting as a scribe while in the presence of the attending physician.:       I,:  Lisandro Wong MD personally performed the services described in this documentation    as scribed in my presence.:

## 2024-05-31 ENCOUNTER — HOSPITAL ENCOUNTER (OUTPATIENT)
Dept: RADIOLOGY | Facility: HOSPITAL | Age: 44
End: 2024-05-31
Attending: STUDENT IN AN ORGANIZED HEALTH CARE EDUCATION/TRAINING PROGRAM
Payer: COMMERCIAL

## 2024-05-31 ENCOUNTER — OFFICE VISIT (OUTPATIENT)
Dept: OBGYN CLINIC | Facility: CLINIC | Age: 44
End: 2024-05-31
Payer: COMMERCIAL

## 2024-05-31 VITALS
WEIGHT: 144 LBS | SYSTOLIC BLOOD PRESSURE: 110 MMHG | HEIGHT: 70 IN | BODY MASS INDEX: 20.62 KG/M2 | DIASTOLIC BLOOD PRESSURE: 76 MMHG

## 2024-05-31 DIAGNOSIS — S62.326A CLOSED DISPLACED FRACTURE OF SHAFT OF FIFTH METACARPAL BONE OF RIGHT HAND, INITIAL ENCOUNTER: ICD-10-CM

## 2024-05-31 DIAGNOSIS — S62.326A CLOSED DISPLACED FRACTURE OF SHAFT OF FIFTH METACARPAL BONE OF RIGHT HAND, INITIAL ENCOUNTER: Primary | ICD-10-CM

## 2024-05-31 PROCEDURE — 73130 X-RAY EXAM OF HAND: CPT

## 2024-05-31 PROCEDURE — 99213 OFFICE O/P EST LOW 20 MIN: CPT | Performed by: STUDENT IN AN ORGANIZED HEALTH CARE EDUCATION/TRAINING PROGRAM

## 2024-05-31 NOTE — PROGRESS NOTES
ORTHOPAEDIC HAND, WRIST, AND ELBOW OFFICE  VISIT      ASSESSMENT/PLAN:      Diagnoses and all orders for this visit:    Closed displaced fracture of shaft of fifth metacarpal bone of right hand, initial encounter  -     Cancel: XR hand 2 vw right; Future          44 y.o. male with right 5th metacarpal fx sustained 5/10/24.  Xrays reviewed  Treatment options and expected outcomes were discussed.  The patient verbalized understanding of exam findings and treatment plan.   The patient was given the opportunity to ask questions.  Questions were answered to the patient's satisfaction.  At this time, pt encouraged to come out of the brace multiple times a day to work on ROM.  Reiterated the importance of working on this to prevent long-term stiffness.      Follow Up:  3 weeks       To Do Next Visit:  X-rays of the  right  hand with 10 degree supinated lateral view  Therapy if needed        Lisandro Wong MD  Attending, Orthopaedic Surgery  Hand, Wrist, and Elbow Surgery  [facility] Orth[facility]    ______________________________________________________________________________________________    CHIEF COMPLAINT:  Chief Complaint   Patient presents with   • Right Hand - Fracture       SUBJECTIVE:  Patient is a 44 y.o. RHD male who presents today for follow up of right 5th metacarpal fx sustained 5/10/24. Pt has been treating this nonoperatively with use of TKO. Pt states he is doing better, but still with some discomfort.      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 No pertinent family history.    SOCIAL HISTORY:  Social  "History     Tobacco Use   • Smoking status: Never   • Smokeless tobacco: Never   Vaping Use   • Vaping status: Never Used   Substance Use Topics   • Alcohol use: Yes     Comment: socially   • Drug use: Not Currently       MEDICATIONS:    Current Outpatient Medications:   •  pantoprazole (PROTONIX) 40 mg tablet, Take 1 tablet (40 mg total) by mouth daily, Disp: 90 tablet, Rfl: 3    ALLERGIES:  No Known Allergies        REVIEW OF SYSTEMS:  Musculoskeletal:        As noted in HPI.   All other systems reviewed and are negative.    VITALS:  Vitals:    05/31/24 0742   BP: 110/76       LABS:  HgA1c: No results found for: \"HGBA1C\"  BMP:   Lab Results   Component Value Date    CALCIUM 9.5 11/19/2023    K 3.7 11/19/2023    CO2 25 11/19/2023     11/19/2023    BUN 16 11/19/2023    CREATININE 1.05 11/19/2023       _____________________________________________________  PHYSICAL EXAMINATION:  General: Well developed and well nourished, alert & oriented x 3, appears comfortable  Psychiatric: Normal  HEENT: Normocephalic, Atraumatic Trachea Midline, No torticollis  Pulmonary: No audible wheezing or respiratory distress   Abdomen/GI: Non tender, non distended   Cardiovascular: No pitting edema, 2+ radial pulse   Skin: No masses, erythema, lacerations, fluctation, ulcerations  Neurovascular: Sensation Intact to the Median, Ulnar, Radial Nerve, Motor Intact to the Median, Ulnar, Radial Nerve, and Pulses Intact  Musculoskeletal: Normal, except as noted in detailed exam and in HPI.      MUSCULOSKELETAL EXAMINATION:  Right Small Finger:  Full digit extension.  Pulp to palm 5cm.   No concerning malrotation.   Finger well perfused.    ___________________________________________________  STUDIES REVIEWED:  Xrays of the right hand were reviewed and independently interpreted in PACS by Dr. Wong and demonstrate similar alignment of pt's oblique metacarpal fx compared to prior films.           PROCEDURES PERFORMED:  Procedures  No " Procedures performed today    _____________________________________________________      Scribe Attestation    I,:  Jeanette Talbot PA-C am acting as a scribe while in the presence of the attending physician.:       I,:  Lisandro Wong MD personally performed the services described in this documentation    as scribed in my presence.:

## 2024-06-15 DIAGNOSIS — Z00.6 ENCOUNTER FOR EXAMINATION FOR NORMAL COMPARISON OR CONTROL IN CLINICAL RESEARCH PROGRAM: ICD-10-CM

## 2024-06-18 ENCOUNTER — APPOINTMENT (OUTPATIENT)
Dept: LAB | Facility: CLINIC | Age: 44
End: 2024-06-18

## 2024-06-18 DIAGNOSIS — Z00.6 ENCOUNTER FOR EXAMINATION FOR NORMAL COMPARISON OR CONTROL IN CLINICAL RESEARCH PROGRAM: ICD-10-CM

## 2024-06-18 PROCEDURE — 36415 COLL VENOUS BLD VENIPUNCTURE: CPT

## 2024-06-21 ENCOUNTER — HOSPITAL ENCOUNTER (OUTPATIENT)
Dept: RADIOLOGY | Facility: HOSPITAL | Age: 44
End: 2024-06-21
Attending: STUDENT IN AN ORGANIZED HEALTH CARE EDUCATION/TRAINING PROGRAM
Payer: COMMERCIAL

## 2024-06-21 ENCOUNTER — OFFICE VISIT (OUTPATIENT)
Dept: OBGYN CLINIC | Facility: CLINIC | Age: 44
End: 2024-06-21
Payer: COMMERCIAL

## 2024-06-21 VITALS — WEIGHT: 144 LBS | BODY MASS INDEX: 20.62 KG/M2 | HEIGHT: 70 IN

## 2024-06-21 DIAGNOSIS — S62.326D CLOSED DISPLACED FRACTURE OF SHAFT OF FIFTH METACARPAL BONE OF RIGHT HAND WITH ROUTINE HEALING, SUBSEQUENT ENCOUNTER: Primary | ICD-10-CM

## 2024-06-21 DIAGNOSIS — S62.326A CLOSED DISPLACED FRACTURE OF SHAFT OF FIFTH METACARPAL BONE OF RIGHT HAND, INITIAL ENCOUNTER: ICD-10-CM

## 2024-06-21 PROCEDURE — 73130 X-RAY EXAM OF HAND: CPT

## 2024-06-21 PROCEDURE — 99213 OFFICE O/P EST LOW 20 MIN: CPT | Performed by: STUDENT IN AN ORGANIZED HEALTH CARE EDUCATION/TRAINING PROGRAM

## 2024-06-21 NOTE — PROGRESS NOTES
ORTHOPAEDIC HAND, WRIST, AND ELBOW OFFICE  VISIT      ASSESSMENT/PLAN:      Diagnoses and all orders for this visit:    Closed displaced fracture of shaft of fifth metacarpal bone of right hand with routine healing, subsequent encounter  -     XR hand 3+ vw right; Future  -     Ambulatory Referral to PT/OT Hand Therapy; Future          44 y.o. male with right 5th metacarpal fx sustained 5/10/24     X-rays were reviewed in the office today which demonstrate healing. He has some stiffness on exam. A referral was provided to OT to work on motion. He was advised to discontinue the brace and can begin to wean back into activity. He has no restrictions at this time.    Follow Up:  2 months       To Do Next Visit:  X-rays of the  right  hand with 10 degree supinated lateral view          Lisandro Wong MD  Attending, Orthopaedic Surgery  Hand, Wrist, and Elbow Surgery  [facility]    ______________________________________________________________________________________________    CHIEF COMPLAINT:  Chief Complaint   Patient presents with   • Right Hand - Follow-up       SUBJECTIVE:  Patient is a 44 y.o. 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 No pertinent family history.    SOCIAL HISTORY:  Social History     Tobacco Use   • Smoking status: Never   • Smokeless tobacco: Never   Vaping Use   • Vaping status: Never Used   Substance Use Topics   • Alcohol use: Yes     Comment: socially   • Drug use: Not Currently  "      MEDICATIONS:    Current Outpatient Medications:   •  pantoprazole (PROTONIX) 40 mg tablet, Take 1 tablet (40 mg total) by mouth daily, Disp: 90 tablet, Rfl: 3    ALLERGIES:  No Known Allergies        REVIEW OF SYSTEMS:  Musculoskeletal:        As noted in HPI.   All other systems reviewed and are negative.    VITALS:  There were no vitals filed for this visit.    LABS:  HgA1c: No results found for: \"HGBA1C\"  BMP:   Lab Results   Component Value Date    CALCIUM 9.5 11/19/2023    K 3.7 11/19/2023    CO2 25 11/19/2023     11/19/2023    BUN 16 11/19/2023    CREATININE 1.05 11/19/2023       _____________________________________________________  PHYSICAL EXAMINATION:  General: Well developed and well nourished, alert & oriented x 3, appears comfortable  Psychiatric: Normal  HEENT: Normocephalic, Atraumatic Trachea Midline, No torticollis  Pulmonary: No audible wheezing or respiratory distress   Abdomen/GI: Non tender, non distended   Cardiovascular: No pitting edema, 2+ radial pulse   Skin: No masses, erythema, lacerations, fluctation, ulcerations  Neurovascular: Sensation Intact to the Median, Ulnar, Radial Nerve, Motor Intact to the Median, Ulnar, Radial Nerve, and Pulses Intact  Musculoskeletal: Normal, except as noted in detailed exam and in HPI.      MUSCULOSKELETAL EXAMINATION:  Right hand  Full finger extension  Pulp to palm 2 cm for small finger  Mild TTP fx site  ___________________________________________________  STUDIES REVIEWED:  Xrays of the right hand were reviewed and independently interpreted in PACS by Dr. Wong and demonstrate healing 5th metacarpal fracture.          PROCEDURES PERFORMED:  Procedures  No Procedures performed today    _____________________________________________________      Scribe Attestation    I,:  Wendy Gil MA am acting as a scribe while in the presence of the attending physician.:       I,:  Lisandro Wong MD personally performed the services described " in this documentation    as scribed in my presence.:

## 2024-07-09 LAB
APOB+LDLR+PCSK9 GENE MUT ANL BLD/T: NOT DETECTED
BRCA1+BRCA2 DEL+DUP + FULL MUT ANL BLD/T: NOT DETECTED
MLH1+MSH2+MSH6+PMS2 GN DEL+DUP+FUL M: NOT DETECTED

## 2024-08-23 ENCOUNTER — OFFICE VISIT (OUTPATIENT)
Dept: OBGYN CLINIC | Facility: CLINIC | Age: 44
End: 2024-08-23
Payer: COMMERCIAL

## 2024-08-23 ENCOUNTER — HOSPITAL ENCOUNTER (OUTPATIENT)
Dept: RADIOLOGY | Facility: HOSPITAL | Age: 44
End: 2024-08-23
Attending: STUDENT IN AN ORGANIZED HEALTH CARE EDUCATION/TRAINING PROGRAM
Payer: COMMERCIAL

## 2024-08-23 VITALS — BODY MASS INDEX: 20.62 KG/M2 | WEIGHT: 144 LBS | HEIGHT: 70 IN

## 2024-08-23 DIAGNOSIS — S62.326D CLOSED DISPLACED FRACTURE OF SHAFT OF FIFTH METACARPAL BONE OF RIGHT HAND WITH ROUTINE HEALING, SUBSEQUENT ENCOUNTER: ICD-10-CM

## 2024-08-23 DIAGNOSIS — S62.326D CLOSED DISPLACED FRACTURE OF SHAFT OF FIFTH METACARPAL BONE OF RIGHT HAND WITH ROUTINE HEALING, SUBSEQUENT ENCOUNTER: Primary | ICD-10-CM

## 2024-08-23 PROCEDURE — 99214 OFFICE O/P EST MOD 30 MIN: CPT | Performed by: STUDENT IN AN ORGANIZED HEALTH CARE EDUCATION/TRAINING PROGRAM

## 2024-08-23 PROCEDURE — 73130 X-RAY EXAM OF HAND: CPT

## 2024-08-23 NOTE — PROGRESS NOTES
ORTHOPAEDIC HAND, WRIST, AND ELBOW OFFICE  VISIT      ASSESSMENT/PLAN:      Diagnoses and all orders for this visit:    Closed displaced fracture of shaft of fifth metacarpal bone of right hand with routine healing, subsequent encounter  -     XR hand 3+ vw right; Future          44 y.o. male with right 5th metacarpal fracture, DOI 5/10/24  X-rays were performed in the office and reviewed, fracture is healed  Hand ROM is full  He may resume activities to his tolerance, no restrictions  I will see him back in the office on an as needed basis       Follow Up:  PRN       To Do Next Visit:  Re-evaluation of current issue      Lisandro Wong MD  Attending, Orthopaedic Surgery  Hand, Wrist, and Elbow Surgery  [facility] Orthopaedic [facility]    ______________________________________________________________________________________________    CHIEF COMPLAINT:  Chief Complaint   Patient presents with   • Right Hand - Follow-up       SUBJECTIVE:  Patient is a 44 y.o. RHD male who presents today for follow up of a right hand fracture. Overall Per is doing well. He was unable to get into formal therapy but has been working on ROM at home. He feels some tightness when making a fist.      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 No pertinent family history.    SOCIAL HISTORY:  Social History     Tobacco Use   • Smoking status: Never   • Smokeless tobacco: Never   Vaping Use   • Vaping status: Never Used   Substance Use Topics   • Alcohol use: Yes     Comment: socially   • Drug use: Not Currently       MEDICATIONS:    Current  "Outpatient Medications:   •  pantoprazole (PROTONIX) 40 mg tablet, Take 1 tablet (40 mg total) by mouth daily, Disp: 90 tablet, Rfl: 3    ALLERGIES:  No Known Allergies        REVIEW OF SYSTEMS:  Musculoskeletal:        As noted in HPI.   All other systems reviewed and are negative.    VITALS:  There were no vitals filed for this visit.    LABS:  HgA1c: No results found for: \"HGBA1C\"  BMP:   Lab Results   Component Value Date    CALCIUM 9.5 11/19/2023    K 3.7 11/19/2023    CO2 25 11/19/2023     11/19/2023    BUN 16 11/19/2023    CREATININE 1.05 11/19/2023       _____________________________________________________  PHYSICAL EXAMINATION:  General: Well developed and well nourished, alert & oriented x 3, appears comfortable  Psychiatric: Normal  HEENT: Normocephalic, Atraumatic Trachea Midline, No torticollis  Pulmonary: No audible wheezing or respiratory distress   Abdomen/GI: Non tender, non distended   Cardiovascular: No pitting edema, 2+ radial pulse   Skin: No masses, erythema, lacerations, fluctation, ulcerations  Neurovascular: Sensation Intact to the Median, Ulnar, Radial Nerve, Motor Intact to the Median, Ulnar, Radial Nerve, and Pulses Intact  Musculoskeletal: Normal, except as noted in detailed exam and in HPI.      MUSCULOSKELETAL EXAMINATION:    Right hand:    No erythema, ecchymosis or edema  Non tender to palpation over fracture site   Full digital extension   Full composite fist     ___________________________________________________  STUDIES REVIEWED:  Xrays of the right hand were reviewed and independently interpreted in PACS by Dr. Wong and demonstrate a healed 5th metacarpal fracture with callus formation.          PROCEDURES PERFORMED:  Procedures  No Procedures performed today    _____________________________________________________      Scribe Attestation    I,:  Elizabeth Gil MA am acting as a scribe while in the presence of the attending physician.:       I,:  Lisandro" MD Marvin personally performed the services described in this documentation    as scribed in my presence.:

## 2024-09-20 DIAGNOSIS — K21.9 GASTROESOPHAGEAL REFLUX DISEASE, UNSPECIFIED WHETHER ESOPHAGITIS PRESENT: ICD-10-CM

## 2024-09-20 RX ORDER — PANTOPRAZOLE SODIUM 40 MG/1
40 TABLET, DELAYED RELEASE ORAL DAILY
Qty: 90 TABLET | Refills: 1 | Status: SHIPPED | OUTPATIENT
Start: 2024-09-20

## 2024-12-14 DIAGNOSIS — K21.9 GASTROESOPHAGEAL REFLUX DISEASE, UNSPECIFIED WHETHER ESOPHAGITIS PRESENT: ICD-10-CM

## 2024-12-16 RX ORDER — PANTOPRAZOLE SODIUM 40 MG/1
40 TABLET, DELAYED RELEASE ORAL DAILY
Qty: 90 TABLET | Refills: 1 | Status: SHIPPED | OUTPATIENT
Start: 2024-12-16

## 2024-12-16 NOTE — TELEPHONE ENCOUNTER
Called the pt to schedule a 1 year fu ov for a medication check.  Left a vm for pt to return the call.

## 2025-01-22 DIAGNOSIS — K21.9 GASTROESOPHAGEAL REFLUX DISEASE, UNSPECIFIED WHETHER ESOPHAGITIS PRESENT: ICD-10-CM

## 2025-01-23 RX ORDER — PANTOPRAZOLE SODIUM 40 MG/1
40 TABLET, DELAYED RELEASE ORAL 2 TIMES DAILY
Qty: 60 TABLET | Refills: 5 | Status: SHIPPED | OUTPATIENT
Start: 2025-01-23

## 2025-02-19 DIAGNOSIS — K21.9 GASTROESOPHAGEAL REFLUX DISEASE, UNSPECIFIED WHETHER ESOPHAGITIS PRESENT: ICD-10-CM

## 2025-02-19 RX ORDER — PANTOPRAZOLE SODIUM 40 MG/1
40 TABLET, DELAYED RELEASE ORAL 2 TIMES DAILY
Qty: 180 TABLET | Refills: 1 | Status: SHIPPED | OUTPATIENT
Start: 2025-02-19

## 2025-05-28 ENCOUNTER — OFFICE VISIT (OUTPATIENT)
Dept: GASTROENTEROLOGY | Facility: AMBULARY SURGERY CENTER | Age: 45
End: 2025-05-28
Payer: COMMERCIAL

## 2025-05-28 VITALS
OXYGEN SATURATION: 98 % | HEART RATE: 75 BPM | DIASTOLIC BLOOD PRESSURE: 74 MMHG | SYSTOLIC BLOOD PRESSURE: 110 MMHG | WEIGHT: 149.8 LBS | BODY MASS INDEX: 21.45 KG/M2 | HEIGHT: 70 IN

## 2025-05-28 DIAGNOSIS — K21.00 GASTROESOPHAGEAL REFLUX DISEASE WITH ESOPHAGITIS WITHOUT HEMORRHAGE: Primary | ICD-10-CM

## 2025-05-28 DIAGNOSIS — Z86.0100 HISTORY OF COLON POLYPS: ICD-10-CM

## 2025-05-28 PROCEDURE — 99213 OFFICE O/P EST LOW 20 MIN: CPT | Performed by: PHYSICIAN ASSISTANT

## 2025-05-28 RX ORDER — FAMOTIDINE 20 MG/1
20 TABLET, FILM COATED ORAL
Qty: 30 TABLET | Refills: 3 | Status: SHIPPED | OUTPATIENT
Start: 2025-05-28

## 2025-05-28 NOTE — PROGRESS NOTES
Name: Per Campos      : 1980      MRN: [id number]  Encounter Provider: Shreya Montemayor PA-C  Encounter Date: 2025   Encounter department: [facility]  :  Assessment & Plan  Gastroesophageal reflux disease with esophagitis without hemorrhage  -patient taking PPI BID but does skip some days, advised to try to take daily and can use pepcid in evening if needed  -call with any worsening symptoms  -tried to go down to once daily over the holidays and felt like he was not digesting well and getting regurgitation   Orders:    famotidine (PEPCID) 20 mg tablet; Take 1 tablet (20 mg total) by mouth daily at bedtime    History of colon polyps  -repeat colonoscopy in         Follow up in 1 year     History of Present Illness   HPI  Per Campos is a 45 y.o. male with a history of GERD who presents for a follow-up.  He had an EGD and colonoscopy last year after having diverticulitis and for acid reflux symptoms. he reports doing relatively well.  He is taking pantoprazole twice daily on some days but on some days he skips it entirely.  Over the holidays he tried to go down to once daily however his symptoms worsened with regurgitation so he went back to twice daily.  This has since resolved.  Denies any dysphagia.  Denies any early satiety, nausea, vomiting.  He denies any abdominal pain.  Reports pretty regular bowel movements aside from some loose stools last week when he had a cold.  Denies any blood in the stool or black tarry stool.  He is due for repeat colonoscopy in .    Review of Systems   Constitutional:  Negative for activity change, appetite change, fever and unexpected weight change.   HENT:  Negative for drooling, mouth sores, sore throat, trouble swallowing and voice change.    Eyes:  Negative for pain, discharge and visual disturbance.   Respiratory:  Negative for cough, choking, chest tightness and shortness of breath.    Cardiovascular:   "Negative for chest pain, palpitations and leg swelling.   Gastrointestinal:  Negative for abdominal distention, abdominal pain, anal bleeding, blood in stool, constipation, diarrhea, nausea, rectal pain and vomiting.   Genitourinary:  Negative for decreased urine volume, difficulty urinating, dysuria, flank pain and urgency.   Musculoskeletal:  Negative for arthralgias, back pain, gait problem, myalgias and neck stiffness.   Skin:  Negative for color change, pallor and rash.   Neurological:  Negative for dizziness, syncope and light-headedness.   Hematological:  Negative for adenopathy.   Psychiatric/Behavioral:  Negative for confusion. The patient is not nervous/anxious.           Objective   /74 (BP Location: Left arm, Patient Position: Sitting, Cuff Size: Standard)   Pulse 75   Ht 5' 10\" (1.778 m)   Wt 67.9 kg (149 lb 12.8 oz)   SpO2 98%   BMI 21.49 kg/m²      Physical Exam  Constitutional:       General: He is not in acute distress.     Appearance: Normal appearance. He is well-developed.   HENT:      Head: Normocephalic and atraumatic.      Mouth/Throat:      Mouth: Mucous membranes are moist.     Eyes:      General: No scleral icterus.    Neck:      Trachea: No tracheal deviation.     Cardiovascular:      Rate and Rhythm: Normal rate and regular rhythm.      Heart sounds: Normal heart sounds. No murmur heard.  Pulmonary:      Effort: Pulmonary effort is normal. No respiratory distress.      Breath sounds: Normal breath sounds. No wheezing or rales.   Chest:      Chest wall: No tenderness.   Abdominal:      General: Bowel sounds are normal. There is no distension.      Palpations: Abdomen is soft. There is no mass.      Tenderness: There is no abdominal tenderness. There is no guarding or rebound.     Musculoskeletal:         General: Normal range of motion.      Cervical back: Normal range of motion and neck supple.     Skin:     General: Skin is warm and dry.      Coloration: Skin is not pale.      " Findings: No rash.     Neurological:      Mental Status: He is alert and oriented to person, place, and time. Mental status is at baseline.     Psychiatric:         Mood and Affect: Mood normal.         Behavior: Behavior normal.

## 2025-05-29 DIAGNOSIS — K21.9 GASTROESOPHAGEAL REFLUX DISEASE, UNSPECIFIED WHETHER ESOPHAGITIS PRESENT: ICD-10-CM

## 2025-05-29 RX ORDER — PANTOPRAZOLE SODIUM 40 MG/1
40 TABLET, DELAYED RELEASE ORAL 2 TIMES DAILY
Qty: 180 TABLET | Refills: 1 | Status: SHIPPED | OUTPATIENT
Start: 2025-05-29

## 2025-06-24 DIAGNOSIS — K21.00 GASTROESOPHAGEAL REFLUX DISEASE WITH ESOPHAGITIS WITHOUT HEMORRHAGE: ICD-10-CM

## 2025-06-24 RX ORDER — FAMOTIDINE 20 MG/1
20 TABLET, FILM COATED ORAL
Qty: 90 TABLET | Refills: 1 | Status: SHIPPED | OUTPATIENT
Start: 2025-06-24